# Patient Record
Sex: FEMALE | Race: WHITE | Employment: PART TIME | URBAN - NONMETROPOLITAN AREA
[De-identification: names, ages, dates, MRNs, and addresses within clinical notes are randomized per-mention and may not be internally consistent; named-entity substitution may affect disease eponyms.]

---

## 2017-01-10 DIAGNOSIS — I25.10 CORONARY ARTERY DISEASE INVOLVING NATIVE CORONARY ARTERY OF NATIVE HEART WITHOUT ANGINA PECTORIS: Primary | ICD-10-CM

## 2017-01-11 RX ORDER — CLOPIDOGREL BISULFATE 75 MG/1
75 TABLET ORAL DAILY
Qty: 90 TABLET | Refills: 3 | Status: SHIPPED | OUTPATIENT
Start: 2017-01-11 | End: 2017-06-13 | Stop reason: SDUPTHER

## 2017-06-05 ENCOUNTER — TELEPHONE (OUTPATIENT)
Dept: CARDIOLOGY | Age: 57
End: 2017-06-05

## 2017-06-13 DIAGNOSIS — I10 ESSENTIAL HYPERTENSION: Primary | ICD-10-CM

## 2017-06-13 DIAGNOSIS — I25.10 CORONARY ARTERY DISEASE INVOLVING NATIVE CORONARY ARTERY OF NATIVE HEART WITHOUT ANGINA PECTORIS: ICD-10-CM

## 2017-06-13 RX ORDER — LISINOPRIL 2.5 MG/1
2.5 TABLET ORAL DAILY
Qty: 30 TABLET | Refills: 3 | Status: SHIPPED | OUTPATIENT
Start: 2017-06-13 | End: 2017-11-07 | Stop reason: SDUPTHER

## 2017-06-13 RX ORDER — CLOPIDOGREL BISULFATE 75 MG/1
75 TABLET ORAL DAILY
Qty: 30 TABLET | Refills: 3 | Status: SHIPPED | OUTPATIENT
Start: 2017-06-13 | End: 2017-10-30 | Stop reason: SDUPTHER

## 2017-07-19 ENCOUNTER — OFFICE VISIT (OUTPATIENT)
Dept: CARDIOLOGY | Age: 57
End: 2017-07-19
Payer: COMMERCIAL

## 2017-07-19 VITALS
HEIGHT: 66 IN | HEART RATE: 70 BPM | DIASTOLIC BLOOD PRESSURE: 78 MMHG | BODY MASS INDEX: 34.39 KG/M2 | WEIGHT: 214 LBS | SYSTOLIC BLOOD PRESSURE: 110 MMHG

## 2017-07-19 DIAGNOSIS — Z95.5 PRESENCE OF DRUG COATED STENT IN LAD CORONARY ARTERY: ICD-10-CM

## 2017-07-19 DIAGNOSIS — I10 ESSENTIAL HYPERTENSION: ICD-10-CM

## 2017-07-19 DIAGNOSIS — E78.2 MIXED HYPERLIPIDEMIA: ICD-10-CM

## 2017-07-19 DIAGNOSIS — I25.10 CORONARY ARTERY DISEASE INVOLVING NATIVE CORONARY ARTERY OF NATIVE HEART WITHOUT ANGINA PECTORIS: Primary | ICD-10-CM

## 2017-07-19 PROCEDURE — 99213 OFFICE O/P EST LOW 20 MIN: CPT | Performed by: CLINICAL NURSE SPECIALIST

## 2017-07-19 ASSESSMENT — ENCOUNTER SYMPTOMS
SHORTNESS OF BREATH: 0
HEARTBURN: 0
COUGH: 0
BLURRED VISION: 0
BLOOD IN STOOL: 0
VOMITING: 0
NAUSEA: 0
ORTHOPNEA: 0
CHEST TIGHTNESS: 0

## 2017-10-30 DIAGNOSIS — I25.10 CORONARY ARTERY DISEASE INVOLVING NATIVE CORONARY ARTERY OF NATIVE HEART WITHOUT ANGINA PECTORIS: ICD-10-CM

## 2017-10-31 RX ORDER — CLOPIDOGREL BISULFATE 75 MG/1
75 TABLET ORAL DAILY
Qty: 30 TABLET | Refills: 5 | Status: SHIPPED | OUTPATIENT
Start: 2017-10-31 | End: 2017-12-11 | Stop reason: SDUPTHER

## 2017-11-07 DIAGNOSIS — I10 ESSENTIAL HYPERTENSION: ICD-10-CM

## 2017-11-07 RX ORDER — LISINOPRIL 2.5 MG/1
2.5 TABLET ORAL DAILY
Qty: 30 TABLET | Refills: 5 | Status: SHIPPED | OUTPATIENT
Start: 2017-11-07 | End: 2017-12-11 | Stop reason: SDUPTHER

## 2017-12-11 DIAGNOSIS — I10 ESSENTIAL HYPERTENSION: ICD-10-CM

## 2017-12-11 DIAGNOSIS — I25.10 CORONARY ARTERY DISEASE INVOLVING NATIVE CORONARY ARTERY OF NATIVE HEART WITHOUT ANGINA PECTORIS: ICD-10-CM

## 2017-12-11 RX ORDER — CLOPIDOGREL BISULFATE 75 MG/1
75 TABLET ORAL DAILY
Qty: 90 TABLET | Refills: 3 | Status: SHIPPED | OUTPATIENT
Start: 2017-12-11 | End: 2018-04-10 | Stop reason: SDUPTHER

## 2017-12-11 RX ORDER — LISINOPRIL 2.5 MG/1
2.5 TABLET ORAL DAILY
Qty: 90 TABLET | Refills: 3 | Status: SHIPPED | OUTPATIENT
Start: 2017-12-11 | End: 2018-04-10 | Stop reason: SDUPTHER

## 2018-01-02 ENCOUNTER — TELEPHONE (OUTPATIENT)
Dept: CARDIOLOGY | Age: 58
End: 2018-01-02

## 2018-04-10 DIAGNOSIS — I10 ESSENTIAL HYPERTENSION: ICD-10-CM

## 2018-04-10 DIAGNOSIS — I25.10 CORONARY ARTERY DISEASE INVOLVING NATIVE CORONARY ARTERY OF NATIVE HEART WITHOUT ANGINA PECTORIS: ICD-10-CM

## 2018-04-10 RX ORDER — CLOPIDOGREL BISULFATE 75 MG/1
75 TABLET ORAL DAILY
Qty: 90 TABLET | Refills: 0 | Status: SHIPPED | OUTPATIENT
Start: 2018-04-10 | End: 2018-04-13 | Stop reason: SDUPTHER

## 2018-04-10 RX ORDER — LISINOPRIL 2.5 MG/1
2.5 TABLET ORAL DAILY
Qty: 90 TABLET | Refills: 0 | Status: SHIPPED | OUTPATIENT
Start: 2018-04-10 | End: 2018-04-13 | Stop reason: SDUPTHER

## 2018-04-13 ENCOUNTER — TELEPHONE (OUTPATIENT)
Dept: CARDIOLOGY | Age: 58
End: 2018-04-13

## 2018-04-13 DIAGNOSIS — I10 ESSENTIAL HYPERTENSION: ICD-10-CM

## 2018-04-13 DIAGNOSIS — I25.10 CORONARY ARTERY DISEASE INVOLVING NATIVE CORONARY ARTERY OF NATIVE HEART WITHOUT ANGINA PECTORIS: ICD-10-CM

## 2018-04-13 RX ORDER — LISINOPRIL 2.5 MG/1
2.5 TABLET ORAL DAILY
Qty: 90 TABLET | Refills: 0 | Status: SHIPPED | OUTPATIENT
Start: 2018-04-13 | End: 2018-07-25 | Stop reason: SDUPTHER

## 2018-04-13 RX ORDER — CLOPIDOGREL BISULFATE 75 MG/1
75 TABLET ORAL DAILY
Qty: 90 TABLET | Refills: 0 | Status: SHIPPED | OUTPATIENT
Start: 2018-04-13 | End: 2018-07-25 | Stop reason: SDUPTHER

## 2018-07-25 ENCOUNTER — OFFICE VISIT (OUTPATIENT)
Dept: CARDIOLOGY | Age: 58
End: 2018-07-25
Payer: COMMERCIAL

## 2018-07-25 VITALS
DIASTOLIC BLOOD PRESSURE: 78 MMHG | SYSTOLIC BLOOD PRESSURE: 118 MMHG | WEIGHT: 211 LBS | HEIGHT: 61 IN | BODY MASS INDEX: 39.84 KG/M2 | HEART RATE: 71 BPM

## 2018-07-25 DIAGNOSIS — I25.10 CORONARY ARTERY DISEASE INVOLVING NATIVE CORONARY ARTERY OF NATIVE HEART WITHOUT ANGINA PECTORIS: ICD-10-CM

## 2018-07-25 DIAGNOSIS — E78.2 MIXED HYPERLIPIDEMIA: Primary | ICD-10-CM

## 2018-07-25 DIAGNOSIS — I10 ESSENTIAL HYPERTENSION: ICD-10-CM

## 2018-07-25 PROCEDURE — 1036F TOBACCO NON-USER: CPT | Performed by: NURSE PRACTITIONER

## 2018-07-25 PROCEDURE — G8417 CALC BMI ABV UP PARAM F/U: HCPCS | Performed by: NURSE PRACTITIONER

## 2018-07-25 PROCEDURE — 3017F COLORECTAL CA SCREEN DOC REV: CPT | Performed by: NURSE PRACTITIONER

## 2018-07-25 PROCEDURE — G8427 DOCREV CUR MEDS BY ELIG CLIN: HCPCS | Performed by: NURSE PRACTITIONER

## 2018-07-25 PROCEDURE — G8598 ASA/ANTIPLAT THER USED: HCPCS | Performed by: NURSE PRACTITIONER

## 2018-07-25 PROCEDURE — 99213 OFFICE O/P EST LOW 20 MIN: CPT | Performed by: NURSE PRACTITIONER

## 2018-07-25 PROCEDURE — 93000 ELECTROCARDIOGRAM COMPLETE: CPT | Performed by: NURSE PRACTITIONER

## 2018-07-25 RX ORDER — NITROGLYCERIN 0.4 MG/1
0.4 TABLET SUBLINGUAL EVERY 5 MIN PRN
Qty: 25 TABLET | Refills: 3 | Status: SHIPPED | OUTPATIENT
Start: 2018-07-25

## 2018-07-25 RX ORDER — CLOPIDOGREL BISULFATE 75 MG/1
75 TABLET ORAL DAILY
Qty: 90 TABLET | Refills: 0 | Status: SHIPPED | OUTPATIENT
Start: 2018-07-25 | End: 2018-09-19 | Stop reason: SDUPTHER

## 2018-07-25 RX ORDER — LISINOPRIL 2.5 MG/1
2.5 TABLET ORAL DAILY
Qty: 90 TABLET | Refills: 0 | Status: SHIPPED | OUTPATIENT
Start: 2018-07-25 | End: 2018-09-19 | Stop reason: SDUPTHER

## 2018-07-25 NOTE — PROGRESS NOTES
Dear Dr. Basim Villafuerte MD,    Thank you for allowing me to participate in the care of Ms. Tracey Funez. She presents today at the [address] [place] [address] in the [facility]. As you know, Ms. Dino Concepcion is a 62 y.o. female with history of hypertension, hyperlipidemia,  and CAD s/p stent LAD (2013) who presents with the chief complaint of follow-up for chronic cardiac conditions. She is needing refills on Plavix and Lisinopril. She has done well since last seen. She moved to [place] for a few months and missed her last appointment with Dr. Terry Vee. She denies any chest pain or SOA. She is not too active at home due to chromic back pain. She has been compliant with her medications. Her BP has been controlled. Her PCP follows labs and lipids. She otherwise denies chest pain, SOA, PINTO, PND, orthopnea, syncope or near syncope. She has no other complaints. Review of Systems   Constitutional: Negative for fever, chills, diaphoresis, activity change, appetite change, fatigue and unexpected weight change. Eyes: Negative for photophobia, pain, redness and visual disturbance. Respiratory: Negative for apnea, cough, chest tightness, shortness of breath, wheezing and stridor. Cardiovascular: Negative for chest pain, palpitations and leg swelling. Gastrointestinal: Negative for abdominal distention. Genitourinary: Negative for dysuria, urgency and frequency. Musculoskeletal: Negative for myalgias, arthralgias and gait problem. Skin: Negative for color change, pallor, rash and wound. Neurological: Negative for dizziness, tremors, speech difficulty, weakness and numbness. Hematological: Does not bruise/bleed easily. Psychiatric/Behavioral: Negative.         Past Medical History:   Diagnosis Date    CAD (coronary artery disease) 09/13/2013    EF 20-25% 2013, EF 50-55% 2016    Carpal tunnel syndrome     HTN (hypertension)     Hyperlipidemia     MI (myocardial infarction)     Mixed hyperlipidemia 7/19/2017    Post-menopausal     Presence of drug coated stent in LAD coronary artery     Psoriatic arthritis ([facility] [address]     Sleep apnea     cpap       Past Surgical History:   Procedure Laterality Date    CARDIAC CATHETERIZATION  7/28/13  MDL    angioplasty, stent to LAD. EF 20-25%    CARPAL TUNNEL RELEASE Bilateral 12/27/2016    CARPAL TUNNEL RELEASE performed by Alfa Avendaño MD at [address] [place]      ankle fx. repair 1994    SINUS SURGERY         Family History   Problem Relation Age of Onset    Heart Disease Father        Social History     Social History    Marital status:      Spouse name: N/A    Number of children: N/A    Years of education: N/A     Occupational History    Not on file.      Social History Main Topics    Smoking status: Former Smoker     Types: Cigarettes     Quit date: 7/28/2013    Smokeless tobacco: Never Used    Alcohol use No    Drug use: No    Sexual activity: Not on file     Other Topics Concern    Not on file     Social History Narrative    No narrative on file       Allergies   Allergen Reactions    Chantix [Varenicline] Hives    Morphine          Current Outpatient Prescriptions:     nitroGLYCERIN (NITROSTAT) 0.4 MG SL tablet, Place 1 tablet under the tongue every 5 minutes as needed for Chest pain, Disp: 25 tablet, Rfl: 3    lisinopril (PRINIVIL;ZESTRIL) 2.5 MG tablet, Take 1 tablet by mouth daily, Disp: 90 tablet, Rfl: 0    clopidogrel (PLAVIX) 75 MG tablet, Take 1 tablet by mouth daily, Disp: 90 tablet, Rfl: 0    HYDROcodone-acetaminophen (NORCO) 5-325 MG per tablet, 1-2 tabs po q4-6 hrs prn pain., Disp: 25 tablet, Rfl: 0    ondansetron (ZOFRAN) 4 MG tablet, Take 1 tablet by mouth every 8 hours as needed for Nausea or Vomiting, Disp: 10 tablet, Rfl: 0    levothyroxine (SYNTHROID) 112 MCG tablet, Take 112 mcg by mouth daily Indications: Underactive Thyroid , Disp: , Rfl:     simvastatin (ZOCOR) 40 MG tablet, Take 1 tablet by mouth nightly (Patient taking differently: Take 40 mg by mouth nightly Indications: Blood Cholesterol Abnormal ), Disp: 90 tablet, Rfl: 3    methotrexate (RHEUMATREX) 2.5 MG chemo tablet, Take 2.5 mg by mouth once a week Indications: Psoriasis associated with Arthritis, on saturdays Take 6 tablets weekly, Disp: , Rfl:     folic acid (FOLVITE) 1 MG tablet, Take 1 mg by mouth daily, Disp: , Rfl:     aspirin 81 MG tablet, Take 81 mg by mouth daily. , Disp: , Rfl:     bisoprolol-hydrochlorothiazide (ZIAC) 5-6.25 MG per tablet, Take 1 tablet by mouth daily Indications: High Blood Pressure , Disp: , Rfl:     PE:  Vitals:    07/25/18 0923   BP: 118/78   Pulse: 71       Estimated body mass index is 39.87 kg/m² as calculated from the following:    Height as of this encounter: 5' 1\" (1.549 m). Weight as of this encounter: 211 lb (95.7 kg). Constitutional: She is oriented to person, place, and time. She appears well-developed and well-nourished in no acute distress. Head: Normocephalic and atraumatic. Neck:  Neck supple without JVD present. Cardiovascular: Normal rate, Normal rhythm, normal heart sounds. No murmur ascultated. No gallop and no friction rub. No carotid bruits. No peripheral edema. Pulmonary/Chest:  Lungs clear to auscultation bilaterally without evidence of respiratory distress. She without wheezes. She without rales or ronchi. Musculoskeletal: Normal range of motion. Gait is normal.   Neurological: She is alert and oriented to person, place, and time. Skin: Skin is warm and dry without rash or pallor. Psychiatric: She has a normal mood and affect.  Her behavior is normal. Thought content normal.     Lab Results   Component Value Date    CREATININE 0.7 10/14/2016    CREATININE 0.8 08/03/2015    CREATININE 0.9 04/26/2014    HGB 12.9 10/14/2016    HGB 13.3 08/03/2015    HGB 12.0 04/26/2014       ECG 07/25/18  Sinus Rhythm, HR 72       Assessment,

## 2018-07-25 NOTE — PATIENT INSTRUCTIONS
Angina usually lasts for about 2-10 minutes. It is often relieved with rest. Angina can be triggered by:   Exercise or exertion   Emotional stress   Cold weather   A large meal   Chest pain may indicate more serious unstable angina or a heart attack if: It is unrelieved by rest or nitroglycerin   Severe angina   Angina that begins at rest (with no activity)   Angina that lasts more than 15 minutes   Accompanying symptoms may include:   Shortness of breath   Sweating   Nausea   Weakness   Immediate medical attention is needed for unstable angina. CAD in women may cause less classic chest pain. It is likely to start with shortness of breath and fatigue. Diagnosis   If you go to the emergency room with chest pain, some tests will be done right away. The tests will attempt to see if you are having angina or a heart attack. If you have a stable pattern of angina, other tests may be done to determine the severity of your disease. The doctor will ask about your symptoms and medical history. A physical exam will be done.    Tests may include:   Blood tests to look for certain substances in the blood called troponins which help the doctor determine if you are having a heart attack   Electrocardiogram (ECG, EKG) records the heart's activity by measuring electrical currents through the heart muscle, and can reveal evidence of past heart attacks, acute heart attacks, and heart rhythm problems   Echocardiogram uses high-frequency sound waves (ultrasound) to examine the size, shape, and motion of the heart, giving information about the structure and function of the heart   Exercise stress test records the heart's electrical activity during increased physical activity   Nuclear stress test the heart is observed while exercising and radioactive material highlights impaired blood flow to help locate problem areas   Coronary calcium scoring a type of x-ray called a CAT scan that uses a computer to look for the presence of calcium in the heart arteries   Coronary angiography x-rays taken after a dye is injected into the arteries to allows the doctor to look for abnormalities in the arteries   Treatment   Treatment may include:   Nitroglycerin   This medicine is usually given during an attack of angina. It can be given as a tablet that dissolves under the tongue or as a spray. Longer-lasting types can be used to prevent angina before an activity known to cause it. These may be given as pills or applied as patches or ointments. Blood-Thinning Medications   A small, daily dose of aspirin has been shown to decrease the risk of heart attack. Ask your doctor before taking aspirin daily. Warfarin (Coumadin)   Ticlopidine (Ticlid)   Clopidogrel (Plavix)   Beta-Blockers, Calcium-Channel Blockers, and ACE-Inhibitors   These may help prevent angina. In some cases, they may lower the risk of heart attack. Medications to Lower Cholesterol   Medicines, like statins, are often prescribed to people who have CAD. Statins (eg, atorvastatin [Lipitor]) lower cholesterol levels, which can help to prevent CAD events. Revascularization   Patients with severe blockages in their coronary arteries may benefit from procedures to immediately improve blood flow to the heart muscle:   Percutaneous coronary interventions (PCI)such as balloon angioplasty , in some cases, a wire mesh stent is placed to hold the artery open   Coronary artery bypass grafting (CABG) segments of vessels are taken from other areas of the body and are sewn into the heart arteries to reroute blood flow around blockages   Some studies have shown that CABG may be more effective than PCI. Lifestyle changes and intensive medicine may also be just as effective as PCI.    Options for Refractory Angina   For patients who are not candidates for revascularization procedures but have continued angina despite medicine, options include:   Enhanced external counterpulsation (EECP) large air bags are inflated around the legs in tune with the heart beat. The patient receives 5 one-hour treatments per week for seven weeks. This has been shown to reduce angina and may improve symptom-free exercise duration. Transmyocardial revascularization (TMR) surgical procedure done with laser to reduce chest pain. Researchers are also studying gene therapy as a possible treatment. Prevention   To reduce your risk of getting coronary artery disease:   Maintain a healthy weight. Eat a heart healthy diet that is low in saturated fat , red meat and processed meats, and rich in whole grain , fruits, and vegetables . Begin a safe exercise program with the advice of your doctor. If you smoke, quit . Treat your high blood pressure and/or diabetes. Treat high cholesterol or triglycerides. Ask your doctor about taking a low-dose aspirin every day. In certain patients, taking rosuvastatin (Crestor) may be another option. Talk to your doctor. Hypertension  (High Blood Pressure)  Most people with high blood pressure (hypertension) have no symptoms. High blood pressure can be a dangerous problem. Hypertension is dangerous because you may have it and not know it. High blood pressure may mean that your heart needs to work harder to pump blood. Your blood pressure is measured with 2 numbers. The first number is when your heart flexes (contracts), and the second number is when your heart relaxes. The higher the numbers are, the more you are at risk for problems. Write down your blood pressure today. The best blood pressure for adults is 120/80 (mmHg) or lower. It is likely that your blood pressure was recorded at least 2 times today. It is important for you to give these numbers to your doctor. If you do not have a doctor, try to get follow-up care at a hospital or community clinic. You may need to start high blood pressure medicine. You may also need to adjust your medicines as told by your doctor.  Even mild high only aimed at correcting your cholesterol levels, but also at lowering your overall risk for heart disease and strokes. Diet Changes   Eat a diet low in total fat, saturated fat, and cholesterol . Reduce or eliminate the amount of alcohol you drink. Eat more high-fiber foods. Lifestyle Changes   If you are overweight, lose weight . If you smoke, quit . Exercise regularly . Talk to you doctor before starting an exercise program. Criselda Mcgowan may already have hardening of the arteries or heart disease. These conditions increase your risk of having a heart attack while exercising. Make sure other medical conditions such as high blood pressure and diabetes are being treated and controlled. Medications   There are a number of drugs available, such as statins , to treat this condition and help lower your risk for heart disease. Talk to your doctor. Statins have been shown to reduce mortality (death), heart attacks , and stroke. These medicines are best used as additions to diet and exercise and should not replace healthy lifestyle changes. Prevention   To help reduce your chance of getting hyperlipidemia, take the following steps:   Starting at age 21, get cholesterol tests. Eat a diet low in total fat, saturated fat, and cholesterol. If you smoke, quit. Drink alcohol in moderation (two drinks per day for men, one drink per day for women). If you are overweight, lose weight. Exercise regularly. Talk with your doctor first.   If you have diabetes , control your blood sugar. Talk to your doctor about medications you are taking. They may have side effects that cause hyperlipidemia.      Last Reviewed: September 2010 Lora Orourke DO   Updated: 11/9/2010     Low sodium diet (Caution with any packaged/processed food)  Low fat, low saturated fat diet   [employer]

## 2018-09-19 DIAGNOSIS — I10 ESSENTIAL HYPERTENSION: ICD-10-CM

## 2018-09-19 DIAGNOSIS — I25.10 CORONARY ARTERY DISEASE INVOLVING NATIVE CORONARY ARTERY OF NATIVE HEART WITHOUT ANGINA PECTORIS: ICD-10-CM

## 2018-09-19 RX ORDER — LISINOPRIL 2.5 MG/1
2.5 TABLET ORAL DAILY
Qty: 90 TABLET | Refills: 3 | Status: SHIPPED | OUTPATIENT
Start: 2018-09-19 | End: 2019-07-22 | Stop reason: SDUPTHER

## 2018-09-19 RX ORDER — CLOPIDOGREL BISULFATE 75 MG/1
75 TABLET ORAL DAILY
Qty: 90 TABLET | Refills: 3 | Status: SHIPPED | OUTPATIENT
Start: 2018-09-19 | End: 2019-07-22 | Stop reason: SDUPTHER

## 2018-12-23 ENCOUNTER — APPOINTMENT (OUTPATIENT)
Dept: GENERAL RADIOLOGY | Age: 58
End: 2018-12-23
Payer: COMMERCIAL

## 2018-12-23 ENCOUNTER — HOSPITAL ENCOUNTER (EMERGENCY)
Age: 58
Discharge: HOME | End: 2018-12-23
Payer: COMMERCIAL

## 2018-12-23 VITALS
HEIGHT: 61 IN | SYSTOLIC BLOOD PRESSURE: 119 MMHG | TEMPERATURE: 98.5 F | WEIGHT: 211 LBS | DIASTOLIC BLOOD PRESSURE: 73 MMHG | BODY MASS INDEX: 39.84 KG/M2 | HEART RATE: 78 BPM | OXYGEN SATURATION: 93 % | RESPIRATION RATE: 16 BRPM

## 2018-12-23 DIAGNOSIS — S90.211A SUBUNGUAL HEMATOMA OF GREAT TOE OF RIGHT FOOT, INITIAL ENCOUNTER: Primary | ICD-10-CM

## 2018-12-23 DIAGNOSIS — S91.209A AVULSION OF TOENAIL, INITIAL ENCOUNTER: ICD-10-CM

## 2018-12-23 PROCEDURE — 73660 X-RAY EXAM OF TOE(S): CPT

## 2018-12-23 PROCEDURE — 99283 EMERGENCY DEPT VISIT LOW MDM: CPT

## 2018-12-23 PROCEDURE — 90715 TDAP VACCINE 7 YRS/> IM: CPT | Performed by: NURSE PRACTITIONER

## 2018-12-23 PROCEDURE — 6360000002 HC RX W HCPCS: Performed by: NURSE PRACTITIONER

## 2018-12-23 PROCEDURE — 99283 EMERGENCY DEPT VISIT LOW MDM: CPT | Performed by: NURSE PRACTITIONER

## 2018-12-23 PROCEDURE — 2500000003 HC RX 250 WO HCPCS: Performed by: NURSE PRACTITIONER

## 2018-12-23 PROCEDURE — 90471 IMMUNIZATION ADMIN: CPT | Performed by: NURSE PRACTITIONER

## 2018-12-23 PROCEDURE — 11730 AVULSION NAIL PLATE SIMPLE 1: CPT | Performed by: NURSE PRACTITIONER

## 2018-12-23 RX ORDER — LIDOCAINE HYDROCHLORIDE 10 MG/ML
5 INJECTION, SOLUTION EPIDURAL; INFILTRATION; INTRACAUDAL; PERINEURAL ONCE
Status: COMPLETED | OUTPATIENT
Start: 2018-12-23 | End: 2018-12-23

## 2018-12-23 RX ORDER — BUPIVACAINE HYDROCHLORIDE 5 MG/ML
30 INJECTION, SOLUTION EPIDURAL; INTRACAUDAL ONCE
Status: COMPLETED | OUTPATIENT
Start: 2018-12-23 | End: 2018-12-23

## 2018-12-23 RX ORDER — HYDROCODONE BITARTRATE AND ACETAMINOPHEN 5; 325 MG/1; MG/1
1 TABLET ORAL EVERY 6 HOURS PRN
Qty: 12 TABLET | Refills: 0 | Status: SHIPPED | OUTPATIENT
Start: 2018-12-23 | End: 2018-12-26

## 2018-12-23 RX ORDER — DULOXETIN HYDROCHLORIDE 20 MG/1
20 CAPSULE, DELAYED RELEASE ORAL DAILY
COMMUNITY
End: 2019-07-18 | Stop reason: ALTCHOICE

## 2018-12-23 RX ADMIN — BUPIVACAINE HYDROCHLORIDE 150 MG: 5 INJECTION, SOLUTION EPIDURAL; INTRACAUDAL; PERINEURAL at 09:00

## 2018-12-23 RX ADMIN — LIDOCAINE HYDROCHLORIDE 5 ML: 10 INJECTION, SOLUTION EPIDURAL; INFILTRATION; INTRACAUDAL at 09:00

## 2018-12-23 RX ADMIN — TETANUS TOXOID, REDUCED DIPHTHERIA TOXOID AND ACELLULAR PERTUSSIS VACCINE, ADSORBED 0.5 ML: 5; 2.5; 8; 8; 2.5 SUSPENSION INTRAMUSCULAR at 09:39

## 2018-12-23 ASSESSMENT — PAIN SCALES - GENERAL: PAINLEVEL_OUTOF10: 4

## 2018-12-23 ASSESSMENT — PAIN DESCRIPTION - LOCATION: LOCATION: TOE (COMMENT WHICH ONE)

## 2018-12-23 ASSESSMENT — PAIN DESCRIPTION - ORIENTATION: ORIENTATION: RIGHT

## 2018-12-23 ASSESSMENT — PAIN DESCRIPTION - PAIN TYPE: TYPE: ACUTE PAIN

## 2019-01-25 ENCOUNTER — OFFICE VISIT (OUTPATIENT)
Dept: CARDIOLOGY | Age: 59
End: 2019-01-25
Payer: COMMERCIAL

## 2019-01-25 VITALS
DIASTOLIC BLOOD PRESSURE: 88 MMHG | SYSTOLIC BLOOD PRESSURE: 128 MMHG | HEART RATE: 70 BPM | WEIGHT: 203 LBS | HEIGHT: 66 IN | BODY MASS INDEX: 32.62 KG/M2

## 2019-01-25 DIAGNOSIS — E66.09 CLASS 1 OBESITY DUE TO EXCESS CALORIES WITH SERIOUS COMORBIDITY AND BODY MASS INDEX (BMI) OF 32.0 TO 32.9 IN ADULT: ICD-10-CM

## 2019-01-25 DIAGNOSIS — I25.10 CORONARY ARTERY DISEASE INVOLVING NATIVE CORONARY ARTERY OF NATIVE HEART WITHOUT ANGINA PECTORIS: Primary | ICD-10-CM

## 2019-01-25 PROBLEM — E66.811 CLASS 1 OBESITY DUE TO EXCESS CALORIES IN ADULT: Status: ACTIVE | Noted: 2019-01-25

## 2019-01-25 PROCEDURE — G8598 ASA/ANTIPLAT THER USED: HCPCS | Performed by: INTERNAL MEDICINE

## 2019-01-25 PROCEDURE — 93000 ELECTROCARDIOGRAM COMPLETE: CPT | Performed by: INTERNAL MEDICINE

## 2019-01-25 PROCEDURE — G8427 DOCREV CUR MEDS BY ELIG CLIN: HCPCS | Performed by: INTERNAL MEDICINE

## 2019-01-25 PROCEDURE — 1036F TOBACCO NON-USER: CPT | Performed by: INTERNAL MEDICINE

## 2019-01-25 PROCEDURE — 99213 OFFICE O/P EST LOW 20 MIN: CPT | Performed by: INTERNAL MEDICINE

## 2019-01-25 PROCEDURE — G8484 FLU IMMUNIZE NO ADMIN: HCPCS | Performed by: INTERNAL MEDICINE

## 2019-01-25 PROCEDURE — 3017F COLORECTAL CA SCREEN DOC REV: CPT | Performed by: INTERNAL MEDICINE

## 2019-01-25 PROCEDURE — G8417 CALC BMI ABV UP PARAM F/U: HCPCS | Performed by: INTERNAL MEDICINE

## 2019-01-25 RX ORDER — LEVOTHYROXINE SODIUM 0.12 MG/1
125 TABLET ORAL DAILY
COMMUNITY

## 2019-04-28 ENCOUNTER — HOSPITAL ENCOUNTER (OUTPATIENT)
Age: 59
Setting detail: OBSERVATION
LOS: 1 days | Discharge: HOME | End: 2019-04-29
Attending: EMERGENCY MEDICINE | Admitting: FAMILY MEDICINE
Payer: COMMERCIAL

## 2019-04-28 ENCOUNTER — APPOINTMENT (OUTPATIENT)
Dept: GENERAL RADIOLOGY | Age: 59
End: 2019-04-28
Payer: COMMERCIAL

## 2019-04-28 DIAGNOSIS — R07.89 CHEST DISCOMFORT: Primary | ICD-10-CM

## 2019-04-28 PROBLEM — R07.9 CHEST PAIN: Status: ACTIVE | Noted: 2019-04-28

## 2019-04-28 LAB
ALBUMIN SERPL-MCNC: 4.1 G/DL (ref 3.5–5.2)
ALP BLD-CCNC: 74 U/L (ref 35–104)
ALT SERPL-CCNC: 16 U/L (ref 5–33)
ANION GAP SERPL CALCULATED.3IONS-SCNC: 12 MMOL/L (ref 7–19)
AST SERPL-CCNC: 11 U/L (ref 5–32)
BASOPHILS ABSOLUTE: 0.1 K/UL (ref 0–0.2)
BASOPHILS RELATIVE PERCENT: 0.9 % (ref 0–1)
BILIRUB SERPL-MCNC: 0.3 MG/DL (ref 0.2–1.2)
BUN BLDV-MCNC: 25 MG/DL (ref 6–20)
CALCIUM SERPL-MCNC: 9.8 MG/DL (ref 8.6–10)
CHLORIDE BLD-SCNC: 101 MMOL/L (ref 98–111)
CO2: 28 MMOL/L (ref 22–29)
CREAT SERPL-MCNC: 0.8 MG/DL (ref 0.5–0.9)
EOSINOPHILS ABSOLUTE: 0.2 K/UL (ref 0–0.6)
EOSINOPHILS RELATIVE PERCENT: 3 % (ref 0–5)
GFR NON-AFRICAN AMERICAN: >60
GLUCOSE BLD-MCNC: 178 MG/DL (ref 74–109)
HCT VFR BLD CALC: 41.6 % (ref 37–47)
HEMOGLOBIN: 13.1 G/DL (ref 12–16)
LYMPHOCYTES ABSOLUTE: 2.5 K/UL (ref 1.1–4.5)
LYMPHOCYTES RELATIVE PERCENT: 31.3 % (ref 20–40)
MCH RBC QN AUTO: 32.9 PG (ref 27–31)
MCHC RBC AUTO-ENTMCNC: 31.5 G/DL (ref 33–37)
MCV RBC AUTO: 104.5 FL (ref 81–99)
MONOCYTES ABSOLUTE: 0.6 K/UL (ref 0–0.9)
MONOCYTES RELATIVE PERCENT: 7.8 % (ref 0–10)
NEUTROPHILS ABSOLUTE: 4.5 K/UL (ref 1.5–7.5)
NEUTROPHILS RELATIVE PERCENT: 56.7 % (ref 50–65)
PDW BLD-RTO: 13.9 % (ref 11.5–14.5)
PLATELET # BLD: 295 K/UL (ref 130–400)
PMV BLD AUTO: 11.3 FL (ref 9.4–12.3)
POTASSIUM REFLEX MAGNESIUM: 3.9 MMOL/L (ref 3.5–5)
RBC # BLD: 3.98 M/UL (ref 4.2–5.4)
SODIUM BLD-SCNC: 141 MMOL/L (ref 136–145)
TOTAL PROTEIN: 7.4 G/DL (ref 6.6–8.7)
TROPONIN: <0.01 NG/ML (ref 0–0.03)
WBC # BLD: 7.9 K/UL (ref 4.8–10.8)

## 2019-04-28 PROCEDURE — 71045 X-RAY EXAM CHEST 1 VIEW: CPT

## 2019-04-28 PROCEDURE — 36415 COLL VENOUS BLD VENIPUNCTURE: CPT

## 2019-04-28 PROCEDURE — 99223 1ST HOSP IP/OBS HIGH 75: CPT | Performed by: INTERNAL MEDICINE

## 2019-04-28 PROCEDURE — 84484 ASSAY OF TROPONIN QUANT: CPT

## 2019-04-28 PROCEDURE — 93005 ELECTROCARDIOGRAM TRACING: CPT

## 2019-04-28 PROCEDURE — 6370000000 HC RX 637 (ALT 250 FOR IP): Performed by: FAMILY MEDICINE

## 2019-04-28 PROCEDURE — 6360000002 HC RX W HCPCS: Performed by: EMERGENCY MEDICINE

## 2019-04-28 PROCEDURE — 6360000002 HC RX W HCPCS: Performed by: FAMILY MEDICINE

## 2019-04-28 PROCEDURE — G0378 HOSPITAL OBSERVATION PER HR: HCPCS

## 2019-04-28 PROCEDURE — 96375 TX/PRO/DX INJ NEW DRUG ADDON: CPT

## 2019-04-28 PROCEDURE — 80053 COMPREHEN METABOLIC PANEL: CPT

## 2019-04-28 PROCEDURE — 2580000003 HC RX 258: Performed by: INTERNAL MEDICINE

## 2019-04-28 PROCEDURE — 96374 THER/PROPH/DIAG INJ IV PUSH: CPT

## 2019-04-28 PROCEDURE — 99285 EMERGENCY DEPT VISIT HI MDM: CPT

## 2019-04-28 PROCEDURE — 85025 COMPLETE CBC W/AUTO DIFF WBC: CPT

## 2019-04-28 PROCEDURE — 99285 EMERGENCY DEPT VISIT HI MDM: CPT | Performed by: EMERGENCY MEDICINE

## 2019-04-28 PROCEDURE — 96376 TX/PRO/DX INJ SAME DRUG ADON: CPT

## 2019-04-28 RX ORDER — NITROGLYCERIN 0.4 MG/1
0.4 TABLET SUBLINGUAL EVERY 5 MIN PRN
Status: DISCONTINUED | OUTPATIENT
Start: 2019-04-28 | End: 2019-04-29 | Stop reason: HOSPADM

## 2019-04-28 RX ORDER — LISINOPRIL 5 MG/1
2.5 TABLET ORAL DAILY
Status: DISCONTINUED | OUTPATIENT
Start: 2019-04-28 | End: 2019-04-29 | Stop reason: HOSPADM

## 2019-04-28 RX ORDER — SODIUM CHLORIDE 9 MG/ML
INJECTION, SOLUTION INTRAVENOUS CONTINUOUS
Status: DISCONTINUED | OUTPATIENT
Start: 2019-04-28 | End: 2019-04-29 | Stop reason: SDUPTHER

## 2019-04-28 RX ORDER — SIMVASTATIN 40 MG
40 TABLET ORAL NIGHTLY
Status: DISCONTINUED | OUTPATIENT
Start: 2019-04-28 | End: 2019-04-29 | Stop reason: HOSPADM

## 2019-04-28 RX ORDER — ONDANSETRON 2 MG/ML
4 INJECTION INTRAMUSCULAR; INTRAVENOUS ONCE
Status: COMPLETED | OUTPATIENT
Start: 2019-04-28 | End: 2019-04-28

## 2019-04-28 RX ORDER — ONDANSETRON 2 MG/ML
4 INJECTION INTRAMUSCULAR; INTRAVENOUS EVERY 6 HOURS PRN
Status: DISCONTINUED | OUTPATIENT
Start: 2019-04-28 | End: 2019-04-29 | Stop reason: SDUPTHER

## 2019-04-28 RX ORDER — BISOPROLOL FUMARATE AND HYDROCHLOROTHIAZIDE 5; 6.25 MG/1; MG/1
1 TABLET ORAL DAILY
Status: DISCONTINUED | OUTPATIENT
Start: 2019-04-28 | End: 2019-04-29 | Stop reason: HOSPADM

## 2019-04-28 RX ORDER — ASPIRIN 81 MG/1
81 TABLET ORAL ONCE
Status: DISCONTINUED | OUTPATIENT
Start: 2019-04-28 | End: 2019-04-29 | Stop reason: HOSPADM

## 2019-04-28 RX ORDER — DULOXETIN HYDROCHLORIDE 20 MG/1
20 CAPSULE, DELAYED RELEASE ORAL DAILY
Status: DISCONTINUED | OUTPATIENT
Start: 2019-04-28 | End: 2019-04-29 | Stop reason: HOSPADM

## 2019-04-28 RX ORDER — ONDANSETRON 2 MG/ML
4 INJECTION INTRAMUSCULAR; INTRAVENOUS EVERY 8 HOURS PRN
Status: DISCONTINUED | OUTPATIENT
Start: 2019-04-28 | End: 2019-04-29 | Stop reason: SDUPTHER

## 2019-04-28 RX ORDER — ASPIRIN 81 MG/1
81 TABLET, CHEWABLE ORAL DAILY
Status: DISCONTINUED | OUTPATIENT
Start: 2019-04-28 | End: 2019-04-29 | Stop reason: HOSPADM

## 2019-04-28 RX ORDER — CLOPIDOGREL BISULFATE 75 MG/1
75 TABLET ORAL DAILY
Status: DISCONTINUED | OUTPATIENT
Start: 2019-04-28 | End: 2019-04-29 | Stop reason: HOSPADM

## 2019-04-28 RX ORDER — SODIUM CHLORIDE 0.9 % (FLUSH) 0.9 %
10 SYRINGE (ML) INJECTION PRN
Status: DISCONTINUED | OUTPATIENT
Start: 2019-04-28 | End: 2019-04-29 | Stop reason: SDUPTHER

## 2019-04-28 RX ORDER — SODIUM CHLORIDE 0.9 % (FLUSH) 0.9 %
10 SYRINGE (ML) INJECTION EVERY 12 HOURS SCHEDULED
Status: DISCONTINUED | OUTPATIENT
Start: 2019-04-28 | End: 2019-04-29 | Stop reason: SDUPTHER

## 2019-04-28 RX ORDER — ALPRAZOLAM 0.5 MG/1
0.5 TABLET ORAL
Status: ACTIVE | OUTPATIENT
Start: 2019-04-28 | End: 2019-04-28

## 2019-04-28 RX ORDER — ONDANSETRON 4 MG/1
4 TABLET, FILM COATED ORAL EVERY 8 HOURS PRN
Status: DISCONTINUED | OUTPATIENT
Start: 2019-04-28 | End: 2019-04-29 | Stop reason: HOSPADM

## 2019-04-28 RX ORDER — ACETAMINOPHEN 325 MG/1
650 TABLET ORAL EVERY 4 HOURS PRN
Status: DISCONTINUED | OUTPATIENT
Start: 2019-04-28 | End: 2019-04-29 | Stop reason: SDUPTHER

## 2019-04-28 RX ORDER — FOLIC ACID 1 MG/1
1 TABLET ORAL DAILY
Status: DISCONTINUED | OUTPATIENT
Start: 2019-04-28 | End: 2019-04-29 | Stop reason: HOSPADM

## 2019-04-28 RX ADMIN — ONDANSETRON 4 MG: 2 INJECTION INTRAMUSCULAR; INTRAVENOUS at 15:38

## 2019-04-28 RX ADMIN — CLOPIDOGREL BISULFATE 75 MG: 75 TABLET ORAL at 20:15

## 2019-04-28 RX ADMIN — HYDROMORPHONE HYDROCHLORIDE 0.5 MG: 1 INJECTION, SOLUTION INTRAMUSCULAR; INTRAVENOUS; SUBCUTANEOUS at 15:06

## 2019-04-28 RX ADMIN — DULOXETINE HYDROCHLORIDE 20 MG: 20 CAPSULE, DELAYED RELEASE ORAL at 21:05

## 2019-04-28 RX ADMIN — FOLIC ACID 1 MG: 1 TABLET ORAL at 20:15

## 2019-04-28 RX ADMIN — SIMVASTATIN 40 MG: 40 TABLET, FILM COATED ORAL at 20:15

## 2019-04-28 RX ADMIN — METHOTREXATE 15 MG: 2.5 TABLET ORAL at 21:06

## 2019-04-28 RX ADMIN — Medication 10 ML: at 20:14

## 2019-04-28 RX ADMIN — SODIUM CHLORIDE: 9 INJECTION, SOLUTION INTRAVENOUS at 20:14

## 2019-04-28 RX ADMIN — HYDROMORPHONE HYDROCHLORIDE 0.5 MG: 1 INJECTION, SOLUTION INTRAMUSCULAR; INTRAVENOUS; SUBCUTANEOUS at 15:38

## 2019-04-28 ASSESSMENT — PAIN SCALES - GENERAL
PAINLEVEL_OUTOF10: 6
PAINLEVEL_OUTOF10: 0
PAINLEVEL_OUTOF10: 0
PAINLEVEL_OUTOF10: 6
PAINLEVEL_OUTOF10: 3

## 2019-04-28 ASSESSMENT — ENCOUNTER SYMPTOMS
EYES NEGATIVE: 1
VOMITING: 0
NAUSEA: 1
GASTROINTESTINAL NEGATIVE: 1
SHORTNESS OF BREATH: 1
DIARRHEA: 0
RESPIRATORY NEGATIVE: 1
NAUSEA: 0
SHORTNESS OF BREATH: 0
BACK PAIN: 0

## 2019-04-28 NOTE — ED PROVIDER NOTES
[facility] CARE  eMERGENCY dEPARTMENT eNCOUnter      Pt Name: Saritha Callahan  MRN: [id number]  [place] 1960  Date of evaluation: 4/28/2019  Provider: Sheldon Garcia MD    [address] [place] [address]       Chief Complaint   Patient presents with    Chest Pain         HISTORY OF PRESENT ILLNESS   (Location/Symptom, Timing/Onset,Context/Setting, Quality, Duration, Modifying Factors, Severity)  Note limiting factors. Saritha Callahan is a 62 y.o. female who presents to the emergency department      The history is provided by the patient. Chest Pain   Pain location:  L chest  Pain quality: sharp    Pain radiates to:  L shoulder  Pain severity:  Moderate  Onset quality:  Sudden  Duration: 1215. Timing:  Constant  Progression:  Unchanged  Chronicity:  New  Relieved by:  Nothing  Worsened by:  Nothing  Ineffective treatments:  Nitroglycerin (x 2, was given 325 mg ASA en route)  Associated symptoms: diaphoresis, nausea and shortness of breath    Associated symptoms: no back pain, no palpitations and no vomiting    Risk factors: coronary artery disease (past MI, stent 2013)        NursingNotes were reviewed. REVIEW OF SYSTEMS    (2-9 systems for level 4, 10 or more for level 5)     Review of Systems   Constitutional: Positive for diaphoresis. Respiratory: Positive for shortness of breath. Cardiovascular: Positive for chest pain. Negative for palpitations. Gastrointestinal: Positive for nausea. Negative for vomiting. Musculoskeletal: Negative for back pain. All other systems reviewed and are negative. Except as noted above the remainder of the review of systems was reviewed and negative.        PAST MEDICAL HISTORY     Past Medical History:   Diagnosis Date    CAD (coronary artery disease) 09/13/2013    EF 20-25% 2013, EF 50-55% 2016    Carpal tunnel syndrome     Fibromyalgia     HTN (hypertension)     Hyperlipidemia     Mixed hyperlipidemia 7/19/2017    Post-menopausal     Psoriatic arthritis ([facility] [address]     Sleep apnea     cpap         SURGICALHISTORY       Past Surgical History:   Procedure Laterality Date    CARDIAC CATHETERIZATION  7/28/13  MDL    angioplasty, stent to LAD. EF 20-25%    CARPAL TUNNEL RELEASE Bilateral 12/27/2016    CARPAL TUNNEL RELEASE performed by Jane Galindo MD at [address] [place]      ankle fx. repair 1994   [address]       Current Discharge Medication List      CONTINUE these medications which have NOT CHANGED    Details   levothyroxine (SYNTHROID) 125 MCG tablet Take 125 mcg by mouth Daily      DULoxetine (CYMBALTA) 20 MG extended release capsule Take 20 mg by mouth daily      clopidogrel (PLAVIX) 75 MG tablet Take 1 tablet by mouth daily  Qty: 90 tablet, Refills: 3    Associated Diagnoses: Coronary artery disease involving native coronary artery of native heart without angina pectoris      lisinopril (PRINIVIL;ZESTRIL) 2.5 MG tablet Take 1 tablet by mouth daily  Qty: 90 tablet, Refills: 3    Associated Diagnoses: Essential hypertension      nitroGLYCERIN (NITROSTAT) 0.4 MG SL tablet Place 1 tablet under the tongue every 5 minutes as needed for Chest pain  Qty: 25 tablet, Refills: 3    Associated Diagnoses: Mixed hyperlipidemia      ondansetron (ZOFRAN) 4 MG tablet Take 1 tablet by mouth every 8 hours as needed for Nausea or Vomiting  Qty: 10 tablet, Refills: 0      simvastatin (ZOCOR) 40 MG tablet Take 1 tablet by mouth nightly  Qty: 90 tablet, Refills: 3      methotrexate (RHEUMATREX) 2.5 MG chemo tablet Take 2.5 mg by mouth once a week Indications: Psoriasis associated with Arthritis, on saturdays Take 6 tablets weekly      folic acid (FOLVITE) 1 MG tablet Take 1 mg by mouth daily      aspirin 81 MG tablet Take 81 mg by mouth daily.       bisoprolol-hydrochlorothiazide (ZIAC) 5-6.25 MG per tablet Take 1 tablet by mouth daily Indications: High Blood Pressure              ALLERGIES     Chantix [varenicline] and Morphine    FAMILY HISTORY       Family History   Problem Relation Age of Onset    Heart Disease Father           SOCIAL HISTORY       Social History     Socioeconomic History    Marital status:      Spouse name: None    Number of children: None    Years of education: None    Highest education level: None   Occupational History    None   Social Needs    Financial resource strain: None    Food insecurity:     Worry: None     Inability: None    Transportation needs:     Medical: None     Non-medical: None   Tobacco Use    Smoking status: Former Smoker     Types: Cigarettes     Last attempt to quit: 2013     Years since quittin.7    Smokeless tobacco: Never Used   Substance and Sexual Activity    Alcohol use: No    Drug use: No    Sexual activity: None   Lifestyle    Physical activity:     Days per week: None     Minutes per session: None    Stress: None   Relationships    Social connections:     Talks on phone: None     Gets together: None     Attends Voodoo service: None     Active member of club or organization: None     Attends meetings of clubs or organizations: None     Relationship status: None    Intimate partner violence:     Fear of current or ex partner: None     Emotionally abused: None     Physically abused: None     Forced sexual activity: None   Other Topics Concern    None   Social History Narrative     34 years second marriage    She has no children    Education high school    [employer] not presently attending a [employer]    She enjoys cooking and gardening    Remains moderately active but physically sedentary    Smoked one half pack per day quit 6 years ago    Denies alcohol consumption or substance usage       SCREENINGS      @FLOW([id number])@      PHYSICAL EXAM    (up to 7 for level 4, 8 or more for level 5)     ED Triage Vitals [19 1426]   BP Temp Temp Source Pulse Resp SpO2 Height Weight   130/84 98.2 °F (36.8 °C) Oral 84 22 93 % -- 200 lb TROPONIN   CBC   COMPREHENSIVE METABOLIC PANEL W/ REFLEX TO MG FOR LOW K   LIPID PANEL   PREGNANCY, URINE       All other labs were within normal range or not returned as of this dictation. EMERGENCY DEPARTMENT COURSE and DIFFERENTIAL DIAGNOSIS/MDM:   Vitals:    Vitals:    04/28/19 1426 04/28/19 1600 04/28/19 1800 04/28/19 1907   BP: 130/84 121/79 125/74 131/79   Pulse: 84 79 77 81   Resp: 22 22 20 20   Temp: 98.2 °F (36.8 °C)  98.4 °F (36.9 °C) 98.1 °F (36.7 °C)   TempSrc: Oral   Temporal   SpO2: 93% 94% 94% 93%   Weight: 200 lb (90.7 kg)              MDM  Number of Diagnoses or Management Options  Diagnosis management comments: Discussed with Dr. Malou Galindo - silvia, Cardiology      CRITICAL CARE TIME   Total Critical Care time was 0 minutes, excluding separately reportable procedures. There was a high probability of clinically significant/lifethreatening deterioration in the patient's condition which required my urgent intervention. CONSULTS:  IP CONSULT TO CARDIOLOGY  IP CONSULT TO CARDIOLOGY    PROCEDURES:  Unless otherwise noted below, none     Procedures    FINAL IMPRESSION      1.  Chest discomfort          DISPOSITION/PLAN   DISPOSITION Admitted 04/28/2019 04:15:48 PM      PATIENT REFERRED TO:  Houston Pierson MD  [address] [place] [address] [id number]  [place] [phone]            DISCHARGE MEDICATIONS:  Current Discharge Medication List             (Please note that portions of this note were completed with a voice recognition program.  Efforts were made to edit the dictations but occasionally words are mis-transcribed.)    Michele Adorno MD (electronically signed)  Attending Emergency Physician          Michele Adorno MD  04/28/19 [id number]

## 2019-04-28 NOTE — CONSULTS
Past Surgical History:  Past Surgical History:   Procedure Laterality Date    CARDIAC CATHETERIZATION  13  MDL    angioplasty, stent to LAD. EF 20-25%    CARPAL TUNNEL RELEASE Bilateral 2016    CARPAL TUNNEL RELEASE performed by Chavo Kent MD at [address] [facility]      ankle fx. repair 1994    SINUS SURGERY         Past Family History:  Family History   Problem Relation Age of Onset    Heart Disease Father        Past Social History:  Social History     Socioeconomic History    Marital status:      Spouse name: Not on file    Number of children: Not on file    Years of education: Not on file    Highest education level: Not on file   Occupational History    Not on file   Social Needs    Financial resource strain: Not on file    Food insecurity:     Worry: Not on file     Inability: Not on file    Transportation needs:     Medical: Not on file     Non-medical: Not on file   Tobacco Use    Smoking status: Former Smoker     Types: Cigarettes     Last attempt to quit: 2013     Years since quittin.7    Smokeless tobacco: Never Used   Substance and Sexual Activity    Alcohol use: No    Drug use: No    Sexual activity: Not on file   Lifestyle    Physical activity:     Days per week: Not on file     Minutes per session: Not on file    Stress: Not on file   Relationships    Social connections:     Talks on phone: Not on file     Gets together: Not on file     Attends Shinto service: Not on file     Active member of club or organization: Not on file     Attends meetings of clubs or organizations: Not on file     Relationship status: Not on file    Intimate partner violence:     Fear of current or ex partner: Not on file     Emotionally abused: Not on file     Physically abused: Not on file     Forced sexual activity: Not on file   Other Topics Concern    Not on file   Social History Narrative     34 years second marriage    She has no children Education high school    Worship by katerina not presently attending a [employer]    She enjoys cooking and gardening    Remains moderately active but physically sedentary    Smoked one half pack per day quit 6 years ago    Denies alcohol consumption or substance usage       Allergies: Allergies   Allergen Reactions    Chantix [Varenicline] Hives    Morphine        Home Meds:  Prior to Admission medications    Medication Sig Start Date End Date Taking? Authorizing Provider   levothyroxine (SYNTHROID) 125 MCG tablet Take 125 mcg by mouth Daily   Yes Historical Provider, MD   DULoxetine (CYMBALTA) 20 MG extended release capsule Take 20 mg by mouth daily   Yes Historical Provider, MD   clopidogrel (PLAVIX) 75 MG tablet Take 1 tablet by mouth daily 9/19/18  Yes SO Tillman   lisinopril (PRINIVIL;ZESTRIL) 2.5 MG tablet Take 1 tablet by mouth daily 9/19/18  Yes SO Tillman   nitroGLYCERIN (NITROSTAT) 0.4 MG SL tablet Place 1 tablet under the tongue every 5 minutes as needed for Chest pain 7/25/18  Yes SO Chatman   ondansetron (ZOFRAN) 4 MG tablet Take 1 tablet by mouth every 8 hours as needed for Nausea or Vomiting 12/27/16  Yes Nancy Luna MD   simvastatin (ZOCOR) 40 MG tablet Take 1 tablet by mouth nightly  Patient taking differently: Take 40 mg by mouth nightly Indications: Blood Cholesterol Abnormal  6/8/16  Yes Yolanda Chou MD   methotrexate (RHEUMATREX) 2.5 MG chemo tablet Take 2.5 mg by mouth once a week Indications: Psoriasis associated with Arthritis, on saturdays Take 6 tablets weekly 12/31/15  Yes Historical Provider, MD   folic acid (FOLVITE) 1 MG tablet Take 1 mg by mouth daily 12/31/15  Yes Historical Provider, MD   aspirin 81 MG tablet Take 81 mg by mouth daily.    Yes Historical Provider, MD   bisoprolol-hydrochlorothiazide (ZIAC) 5-6.25 MG per tablet Take 1 tablet by mouth daily Indications: High Blood Pressure    Yes Historical Provider, MD       Current CK, CKMB, Troponin: @LABRCNT (CKTOTAL:3, CKMB:3, TROPONINI:3)@    Last 3 BNP:  No results for input(s): BNP in the last 72 hours. IMAGING:  Xr Chest Portable    Result Date: 4/28/2019  XR CHEST PORTABLE 4/28/2019 1:45 PM HISTORY:   Chest pain  Single view. COMPARISONS:  10/14/2016 chest radiography FINDINGS: The lungs are clear without infiltrates. The heart is normal in size, pulmonary circulation appropriate, without heart failure. The bony structures are intact without acute osseous abnormality. . Radiographically, the chest is unchanged. No acute cardiopulmonary process. Signed by Dr Lena Houser on 4/28/2019 2:59 PM      Assessment:  1. Chest heaviness onset at 12:15 today suspicious for unstable angina  2. Several month history of exertional chest discomfort and dyspnea  3. History of myocardial infarction 6 years ago with cardiac catheterization and stent placement  4. Prior tobacco abuse discontinued 6 years ago  5. Hypothyroidism  6. Gastroesophageal reflux disease  7. Hypertension  8. Psoriasis  9. Hyperlipidemia  10. Carpal tunnel syndrome  11. Obesity      Recommendations:  1. Recommend diagnostic catheterization patient agreeable plan tomorrow  I have discussed with the patient regarding indications for the proposed procedure LEFT HEART CATHETERIZATION AND POSSIBLE PERCUTANEOUS INTERVENTION  along with possible alternatives benefits and risks including but not limited to risks of death, myocardial infarction, stroke, contrast induced nephropathy which in some cases may lead to acute kidney failure requiring dialysis, allergic reactions, bleeding requiring blood transfusion,  cardiac arrhthymias, respiratory failure which may require placing the patient on respiratory support such as a ventilator or breathing machine,risk of complications which may require vascular surgery, and if coronary intervention is performed emergency CABG may be required in less than 1% of cases.  The patient is

## 2019-04-28 NOTE — ED NOTES
Bed: 09  Expected date:   Expected time:   Means of arrival:   Comments:  Via Evelio Bradley RN  04/28/19 8424

## 2019-04-28 NOTE — LETTER
[facility] [address]  Phone: [phone]             April 29, 2019    Patient: Carmen Gerber   YOB: 1960   Date of Visit: 4/28/2019       To Whom It May Concern:    Carmen Gerber was seen and treated in our facility  beginning 4/28/2019 until 4/29/19. She may return to work on thursday may 2nd.       Sincerely,       Igor Mak RN         Signature:__________________________________

## 2019-04-29 VITALS
HEART RATE: 96 BPM | OXYGEN SATURATION: 92 % | RESPIRATION RATE: 18 BRPM | BODY MASS INDEX: 34.46 KG/M2 | WEIGHT: 214.4 LBS | DIASTOLIC BLOOD PRESSURE: 80 MMHG | SYSTOLIC BLOOD PRESSURE: 131 MMHG | TEMPERATURE: 97.8 F | HEIGHT: 66 IN

## 2019-04-29 LAB
ALBUMIN SERPL-MCNC: 3.7 G/DL (ref 3.5–5.2)
ALP BLD-CCNC: 64 U/L (ref 35–104)
ALT SERPL-CCNC: 13 U/L (ref 5–33)
ANION GAP SERPL CALCULATED.3IONS-SCNC: 9 MMOL/L (ref 7–19)
AST SERPL-CCNC: 10 U/L (ref 5–32)
BILIRUB SERPL-MCNC: <0.2 MG/DL (ref 0.2–1.2)
BUN BLDV-MCNC: 23 MG/DL (ref 6–20)
CALCIUM SERPL-MCNC: 9.1 MG/DL (ref 8.6–10)
CHLORIDE BLD-SCNC: 104 MMOL/L (ref 98–111)
CHOLESTEROL, TOTAL: 145 MG/DL (ref 160–199)
CO2: 28 MMOL/L (ref 22–29)
CREAT SERPL-MCNC: 0.7 MG/DL (ref 0.5–0.9)
EKG P AXIS: 51 DEGREES
EKG P-R INTERVAL: 168 MS
EKG Q-T INTERVAL: 380 MS
EKG QRS DURATION: 78 MS
EKG QTC CALCULATION (BAZETT): 418 MS
EKG T AXIS: 76 DEGREES
GFR NON-AFRICAN AMERICAN: >60
GLUCOSE BLD-MCNC: 120 MG/DL (ref 74–109)
HCT VFR BLD CALC: 37.1 % (ref 37–47)
HDLC SERPL-MCNC: 42 MG/DL (ref 65–121)
HEMOGLOBIN: 11.6 G/DL (ref 12–16)
LDL CHOLESTEROL CALCULATED: 59 MG/DL
MCH RBC QN AUTO: 32.2 PG (ref 27–31)
MCHC RBC AUTO-ENTMCNC: 31.3 G/DL (ref 33–37)
MCV RBC AUTO: 103.1 FL (ref 81–99)
PDW BLD-RTO: 13.9 % (ref 11.5–14.5)
PLATELET # BLD: 242 K/UL (ref 130–400)
PMV BLD AUTO: 11.1 FL (ref 9.4–12.3)
POTASSIUM REFLEX MAGNESIUM: 3.8 MMOL/L (ref 3.5–5)
RBC # BLD: 3.6 M/UL (ref 4.2–5.4)
SODIUM BLD-SCNC: 141 MMOL/L (ref 136–145)
TOTAL PROTEIN: 6.3 G/DL (ref 6.6–8.7)
TRIGL SERPL-MCNC: 218 MG/DL (ref 0–149)
TROPONIN: <0.01 NG/ML (ref 0–0.03)
WBC # BLD: 8.5 K/UL (ref 4.8–10.8)

## 2019-04-29 PROCEDURE — 36415 COLL VENOUS BLD VENIPUNCTURE: CPT

## 2019-04-29 PROCEDURE — 6370000000 HC RX 637 (ALT 250 FOR IP): Performed by: FAMILY MEDICINE

## 2019-04-29 PROCEDURE — 2709999900 HC NON-CHARGEABLE SUPPLY

## 2019-04-29 PROCEDURE — 99152 MOD SED SAME PHYS/QHP 5/>YRS: CPT | Performed by: INTERNAL MEDICINE

## 2019-04-29 PROCEDURE — 93458 L HRT ARTERY/VENTRICLE ANGIO: CPT

## 2019-04-29 PROCEDURE — C1894 INTRO/SHEATH, NON-LASER: HCPCS

## 2019-04-29 PROCEDURE — 80053 COMPREHEN METABOLIC PANEL: CPT

## 2019-04-29 PROCEDURE — 6360000002 HC RX W HCPCS

## 2019-04-29 PROCEDURE — G0378 HOSPITAL OBSERVATION PER HR: HCPCS

## 2019-04-29 PROCEDURE — 2500000003 HC RX 250 WO HCPCS

## 2019-04-29 PROCEDURE — C1769 GUIDE WIRE: HCPCS

## 2019-04-29 PROCEDURE — 6360000004 HC RX CONTRAST MEDICATION: Performed by: INTERNAL MEDICINE

## 2019-04-29 PROCEDURE — 2580000003 HC RX 258: Performed by: INTERNAL MEDICINE

## 2019-04-29 PROCEDURE — 84484 ASSAY OF TROPONIN QUANT: CPT

## 2019-04-29 PROCEDURE — 99152 MOD SED SAME PHYS/QHP 5/>YRS: CPT

## 2019-04-29 PROCEDURE — 85027 COMPLETE CBC AUTOMATED: CPT

## 2019-04-29 PROCEDURE — 93458 L HRT ARTERY/VENTRICLE ANGIO: CPT | Performed by: INTERNAL MEDICINE

## 2019-04-29 PROCEDURE — 80061 LIPID PANEL: CPT

## 2019-04-29 RX ORDER — HYDRALAZINE HYDROCHLORIDE 20 MG/ML
10 INJECTION INTRAMUSCULAR; INTRAVENOUS EVERY 10 MIN PRN
Status: DISCONTINUED | OUTPATIENT
Start: 2019-04-29 | End: 2019-04-29 | Stop reason: HOSPADM

## 2019-04-29 RX ORDER — SODIUM CHLORIDE 9 MG/ML
1000 INJECTION, SOLUTION INTRAVENOUS CONTINUOUS
Status: DISCONTINUED | OUTPATIENT
Start: 2019-04-29 | End: 2019-04-29 | Stop reason: HOSPADM

## 2019-04-29 RX ORDER — SODIUM CHLORIDE 0.9 % (FLUSH) 0.9 %
10 SYRINGE (ML) INJECTION PRN
Status: DISCONTINUED | OUTPATIENT
Start: 2019-04-29 | End: 2019-04-29 | Stop reason: HOSPADM

## 2019-04-29 RX ORDER — SODIUM CHLORIDE 0.9 % (FLUSH) 0.9 %
10 SYRINGE (ML) INJECTION EVERY 12 HOURS SCHEDULED
Status: DISCONTINUED | OUTPATIENT
Start: 2019-04-29 | End: 2019-04-29 | Stop reason: HOSPADM

## 2019-04-29 RX ORDER — ACETAMINOPHEN 325 MG/1
650 TABLET ORAL EVERY 4 HOURS PRN
Status: DISCONTINUED | OUTPATIENT
Start: 2019-04-29 | End: 2019-04-29 | Stop reason: HOSPADM

## 2019-04-29 RX ORDER — ONDANSETRON 2 MG/ML
4 INJECTION INTRAMUSCULAR; INTRAVENOUS EVERY 6 HOURS PRN
Status: DISCONTINUED | OUTPATIENT
Start: 2019-04-29 | End: 2019-04-29 | Stop reason: HOSPADM

## 2019-04-29 RX ADMIN — ACETAMINOPHEN 650 MG: 325 TABLET ORAL at 05:19

## 2019-04-29 RX ADMIN — LEVOTHYROXINE SODIUM 125 MCG: 75 TABLET ORAL at 05:16

## 2019-04-29 RX ADMIN — IOPAMIDOL 100 ML: 612 INJECTION, SOLUTION INTRAVENOUS at 12:48

## 2019-04-29 RX ADMIN — ASPIRIN 81 MG 81 MG: 81 TABLET ORAL at 09:02

## 2019-04-29 RX ADMIN — LISINOPRIL 2.5 MG: 5 TABLET ORAL at 09:02

## 2019-04-29 RX ADMIN — SODIUM CHLORIDE: 9 INJECTION, SOLUTION INTRAVENOUS at 10:51

## 2019-04-29 ASSESSMENT — PAIN DESCRIPTION - PAIN TYPE
TYPE: ACUTE PAIN
TYPE: ACUTE PAIN

## 2019-04-29 ASSESSMENT — PAIN DESCRIPTION - LOCATION
LOCATION: HEAD
LOCATION: HEAD

## 2019-04-29 ASSESSMENT — PAIN SCALES - GENERAL
PAINLEVEL_OUTOF10: 1
PAINLEVEL_OUTOF10: 0
PAINLEVEL_OUTOF10: 3
PAINLEVEL_OUTOF10: 0
PAINLEVEL_OUTOF10: 0

## 2019-04-29 NOTE — PLAN OF CARE
Problem: Falls - Risk of:  Goal: Will remain free from falls  Description  Will remain free from falls  4/29/2019 1005 by Abby Tidwell RN  Outcome: Ongoing     Problem: Falls - Risk of:  Goal: Absence of physical injury  Description  Absence of physical injury  4/29/2019 1005 by Abby Tidwell RN  Outcome: Ongoing     Problem: Pain:  Goal: Pain level will decrease  Description  Pain level will decrease  4/29/2019 1005 by Abby Tidwell RN  Outcome: Ongoing     Problem: Pain:  Goal: Control of acute pain  Description  Control of acute pain  4/29/2019 1005 by Abby Tidwell RN  Outcome: Ongoing     Problem: Pain:  Goal: Control of chronic pain  Description  Control of chronic pain  4/29/2019 1005 by Abby Tidwell RN  Outcome: Ongoing

## 2019-04-29 NOTE — PROGRESS NOTES
Mir Bronson arrived to room # [phone]. Presented with: chest pain  Mental Status: Patient is oriented, alert and coherent. Vitals:    04/28/19 1907   BP: 131/79   Pulse: 81   Resp: 20   Temp: 98.1 °F (36.7 °C)   SpO2: 93%     Patient safety contract and falls prevention contract reviewed with patient Yes. Oriented Patient to room. Call light within reach. Yes.   Needs, issues or concerns expressed at this time: no.      Electronically signed by Ilya Manzano RN on 4/28/2019 at 8:02 PM

## 2019-04-29 NOTE — PROGRESS NOTES
Last of the air in the tr band removed (3 mls) no bruising, swelling, or bleeding noted pulses palpable

## 2019-04-29 NOTE — H&P
[facility] [address] [facility]                              HISTORY AND PHYSICAL    PATIENT NAME: Humble Bernard                       :        1960  MED REC NO:   [id number]                              ROOM:       [facility]  ACCOUNT NO:   [de-identified]                           ADMIT DATE: 2019  PROVIDER:     Jacquelin Lennox, MD    HISTORY OF PRESENT ILLNESS:  This is a 63-year-old admitted with chest  pain, pressure, left shoulder and jaw pain, reminiscent of her pain when  she had an MI and stent placement. She also had some nausea, vomiting,  diaphoresis and breathlessness, all started yesterday. She woke up  feeling fine. Went to bed feeling fine. No other signs or symptoms in  the last few days. PAST MEDICAL HISTORY:  Her past medical history is significant for  previous MI, previous stent that was in ; history of hypertension,  fibromyalgia, hyperlipidemia, sleep apnea. She has had sinus surgery. She has ankle fracture repair surgery in . She has had carpal  tunnel release on both wrists in 2016. She has had heart cath and  stent placement in 2013. She has psoriatic arthritis followed by Dr. Tami Durán. ALLERGIES:  She has allergies to CHANTIX and MORPHINE. SOCIAL HISTORY:  She is . She is a former smoker, quit in . FAMILY HISTORY:  Positive for heart disease. MEDICATIONS:  Her medicines on admission include Synthroid 125 daily;  duloxetine 20 mg daily; Plavix 75 daily; Prinivil 2.5 daily;  nitroglycerine as needed, sublingually; Zofran 4 mg every 8 as needed;  Zocor 40 mg nightly; Rheumatrex 2.5 mg, she takes 6 of those tablets  weekly; folic acid 1 mg daily; aspirin 81 daily; bisoprolol/HCT 5/6.25  one daily. PHYSICAL EXAMINATION:  VITAL SIGNS:  Blood pressure is 134/74, pulse 70, respirations 20,  temperature 98. 1. HEENT:  Normocephalic, atraumatic. NECK:  Supple.   No bruits. CHEST:  Clear. HEART:  Regular. ABDOMEN:  Benign. EXTREMITIES:  No clubbing, cyanosis or edema. NEUROLOGIC:  Cranial nerves intact. Gross motor and sensory intact. LABORATORY DATA:  Sodium 141, potassium 3.9, chloride 101, CO2 is 28. BUN 25, creatinine is 0.5. Anion gap is 12. GFR is greater than 60. Glucose 178. Calcium is 9.8. Total protein 7.4. Troponin is less than  0.01. LFTs were normal.  White count 7.9, hemoglobin 13.1, platelets  are 168. X-ray of the chest was negative for anything acute. ASSESSMENT:  1. Chest pain suspicious for angina in a patient with previous MI,  previous stent. 2.  Hyperlipidemia. 3.  Hypertension. 4.  Prior smoker, but she quit 6 years ago. 5.  Psoriatic arthritis. 6.  Hypothyroidism. PLAN:  Dr. Lisa Barragan has seen her and plans to do heart cath this  morning. We will follow along for any further changes.         Daron Soriano MD    D: 04/29/2019 7:09:11      T: 04/29/2019 12:53:15     /[email or web address]  Job#: [id number]     Doc#: [id number]    CC:

## 2019-04-30 NOTE — DISCHARGE SUMMARY
[facility] [address] [facility]                               DISCHARGE SUMMARY    PATIENT NAME: Humble Bernard                       :        1960  MED REC NO:   [id number]                              ROOM:       [facility]  ACCOUNT NO:   [de-identified]                           ADMIT DATE: 2019  PROVIDER:     Jacquelin Lennox, MD                   [facility] DATE: 2019    HISTORY AND HOSPITAL COURSE:  This is a 79-year-old admitted for  noncardiac chest pain. She just had her left heart cath, which was  normal.  It showed some mild noncritical coronary artery disease and a  patent stent to the LAD. She is without chest pain. She is feeling  well and without any complaints. She is felt stable for discharge home. PROCEDURES DURING THE ADMISSION:  She had left heart cath as mentioned  above. She had a chest x-ray, which was normal.  EKG did not show any  acute findings. She was stable. Her vital signs were stable, and she  was felt ready for discharge. DISCHARGE MEDICATIONS:  Her medications on discharge will be aspirin 81  mg daily; nitroglycerin sublingual as needed; Cymbalta 20 mg daily;  Zofran 4 mg every 8 hours as needed; simvastatin 40 mg daily; Ziac 5 mg  once daily; Prinivil 2.5 mg daily; methotrexate, she takes six 2.5 mg  tablets every week on Saturday; folic acid 1 mg daily; Plavix 75 mg  daily; and Synthroid 125 mcg daily. She will be discharged home in good condition. We will see her in the  office in 7 to 10 days for hospital followup, sooner for problems. She  should follow the routine post-cath orders for Dr. Dumont's radial  artery approach heart cath and follow up with him as he directs.         Denisse Man MD    D: 2019 16:30:55      T: 2019 3:36:02     /[email or web address]  Job#: [id number]     Doc#: [id number]    CC:

## 2019-05-01 ENCOUNTER — TELEPHONE (OUTPATIENT)
Dept: CARDIOLOGY | Age: 59
End: 2019-05-01

## 2019-05-03 NOTE — TELEPHONE ENCOUNTER
Can we get patient an appointment with Dr. Lucia Antoine towards the end of the month? If that means an overbook that is fine.

## 2019-07-01 ENCOUNTER — TELEPHONE (OUTPATIENT)
Dept: CARDIOLOGY | Age: 59
End: 2019-07-01

## 2019-07-18 ENCOUNTER — OFFICE VISIT (OUTPATIENT)
Dept: CARDIOLOGY | Age: 59
End: 2019-07-18
Payer: COMMERCIAL

## 2019-07-18 VITALS
HEIGHT: 66 IN | HEART RATE: 80 BPM | DIASTOLIC BLOOD PRESSURE: 70 MMHG | WEIGHT: 212 LBS | BODY MASS INDEX: 34.07 KG/M2 | SYSTOLIC BLOOD PRESSURE: 108 MMHG

## 2019-07-18 DIAGNOSIS — Z95.5 H/O HEART ARTERY STENT: ICD-10-CM

## 2019-07-18 DIAGNOSIS — I10 ESSENTIAL HYPERTENSION: Primary | ICD-10-CM

## 2019-07-18 DIAGNOSIS — I25.10 CORONARY ARTERY DISEASE INVOLVING NATIVE CORONARY ARTERY OF NATIVE HEART WITHOUT ANGINA PECTORIS: ICD-10-CM

## 2019-07-18 DIAGNOSIS — E78.2 MIXED HYPERLIPIDEMIA: ICD-10-CM

## 2019-07-18 PROCEDURE — 3017F COLORECTAL CA SCREEN DOC REV: CPT | Performed by: INTERNAL MEDICINE

## 2019-07-18 PROCEDURE — G8417 CALC BMI ABV UP PARAM F/U: HCPCS | Performed by: INTERNAL MEDICINE

## 2019-07-18 PROCEDURE — 99213 OFFICE O/P EST LOW 20 MIN: CPT | Performed by: INTERNAL MEDICINE

## 2019-07-18 PROCEDURE — G8427 DOCREV CUR MEDS BY ELIG CLIN: HCPCS | Performed by: INTERNAL MEDICINE

## 2019-07-18 PROCEDURE — 1036F TOBACCO NON-USER: CPT | Performed by: INTERNAL MEDICINE

## 2019-07-18 PROCEDURE — G8598 ASA/ANTIPLAT THER USED: HCPCS | Performed by: INTERNAL MEDICINE

## 2019-07-18 RX ORDER — CITALOPRAM 40 MG/1
40 TABLET ORAL DAILY
COMMUNITY

## 2019-07-18 RX ORDER — PANTOPRAZOLE SODIUM 40 MG/1
40 TABLET, DELAYED RELEASE ORAL NIGHTLY
COMMUNITY

## 2019-07-18 ASSESSMENT — ENCOUNTER SYMPTOMS
NAUSEA: 0
VOMITING: 0
EYES NEGATIVE: 1
DIARRHEA: 0
SHORTNESS OF BREATH: 0
RESPIRATORY NEGATIVE: 1
GASTROINTESTINAL NEGATIVE: 1

## 2019-07-18 NOTE — PROGRESS NOTES
Fibromyalgia     HTN (hypertension)     Hyperlipidemia     Mixed hyperlipidemia 7/19/2017    Post-menopausal     Psoriatic arthritis ([facility] [address]     Sleep apnea     cpap     Past Surgical History:   Procedure Laterality Date    CARDIAC CATHETERIZATION  7/28/13  MDL    angioplasty, stent to LAD. EF 20-25%    CARPAL TUNNEL RELEASE Bilateral 12/27/2016    CARPAL TUNNEL RELEASE performed by Obdulia Watson [facility] at [facility] OR    ORTHOPEDIC SURGERY      ankle fx. repair 1994    SINUS SURGERY       Family History   Problem Relation Age of Onset    Heart Disease Father      Allergies   Allergen Reactions    Chantix [Varenicline] Hives    Morphine      Current Outpatient Medications   Medication Sig Dispense Refill    citalopram (CELEXA) 40 MG tablet Take 40 mg by mouth daily      pantoprazole (PROTONIX) 40 MG tablet Take 40 mg by mouth daily      levothyroxine (SYNTHROID) 125 MCG tablet Take 125 mcg by mouth Daily      clopidogrel (PLAVIX) 75 MG tablet Take 1 tablet by mouth daily 90 tablet 3    lisinopril (PRINIVIL;ZESTRIL) 2.5 MG tablet Take 1 tablet by mouth daily 90 tablet 3    nitroGLYCERIN (NITROSTAT) 0.4 MG SL tablet Place 1 tablet under the tongue every 5 minutes as needed for Chest pain 25 tablet 3    ondansetron (ZOFRAN) 4 MG tablet Take 1 tablet by mouth every 8 hours as needed for Nausea or Vomiting 10 tablet 0    simvastatin (ZOCOR) 40 MG tablet Take 1 tablet by mouth nightly (Patient taking differently: Take 40 mg by mouth nightly Indications: Blood Cholesterol Abnormal ) 90 tablet 3    methotrexate (RHEUMATREX) 2.5 MG chemo tablet Take 2.5 mg by mouth once a week Indications: Psoriasis associated with Arthritis, on saturdays Take 6 tablets weekly      folic acid (FOLVITE) 1 MG tablet Take 1 mg by mouth daily      aspirin 81 MG tablet Take 81 mg by mouth daily.       bisoprolol-hydrochlorothiazide (ZIAC) 5-6.25 MG per tablet Take 1 tablet by mouth daily Indications: High Blood Pressure        No

## 2019-07-22 DIAGNOSIS — I10 ESSENTIAL HYPERTENSION: ICD-10-CM

## 2019-07-22 DIAGNOSIS — I25.10 CORONARY ARTERY DISEASE INVOLVING NATIVE CORONARY ARTERY OF NATIVE HEART WITHOUT ANGINA PECTORIS: ICD-10-CM

## 2019-07-22 RX ORDER — CLOPIDOGREL BISULFATE 75 MG/1
75 TABLET ORAL DAILY
Qty: 90 TABLET | Refills: 3 | Status: SHIPPED | OUTPATIENT
Start: 2019-07-22 | End: 2019-09-25 | Stop reason: SDUPTHER

## 2019-07-22 RX ORDER — LISINOPRIL 2.5 MG/1
2.5 TABLET ORAL DAILY
Qty: 90 TABLET | Refills: 3 | Status: SHIPPED | OUTPATIENT
Start: 2019-07-22 | End: 2020-04-06

## 2019-09-25 DIAGNOSIS — I25.10 CORONARY ARTERY DISEASE INVOLVING NATIVE CORONARY ARTERY OF NATIVE HEART WITHOUT ANGINA PECTORIS: ICD-10-CM

## 2019-09-25 RX ORDER — CLOPIDOGREL BISULFATE 75 MG/1
75 TABLET ORAL DAILY
Qty: 90 TABLET | Refills: 3 | Status: SHIPPED | OUTPATIENT
Start: 2019-09-25 | End: 2020-04-06

## 2020-02-06 ENCOUNTER — OFFICE VISIT (OUTPATIENT)
Dept: CARDIOLOGY | Age: 60
End: 2020-02-06

## 2020-02-06 VITALS
DIASTOLIC BLOOD PRESSURE: 82 MMHG | HEIGHT: 66 IN | BODY MASS INDEX: 33.59 KG/M2 | HEART RATE: 80 BPM | SYSTOLIC BLOOD PRESSURE: 108 MMHG | WEIGHT: 209 LBS

## 2020-02-06 PROCEDURE — 99213 OFFICE O/P EST LOW 20 MIN: CPT | Performed by: CLINICAL NURSE SPECIALIST

## 2020-02-06 ASSESSMENT — ENCOUNTER SYMPTOMS
SHORTNESS OF BREATH: 0
COUGH: 0
VOMITING: 0
EYE REDNESS: 0
CHEST TIGHTNESS: 0
ABDOMINAL PAIN: 0
NAUSEA: 0
FACIAL SWELLING: 0
WHEEZING: 0

## 2020-02-06 NOTE — PROGRESS NOTES
Morphine    Review of Systems  Review of Systems   Constitutional: Positive for fatigue. Negative for activity change, diaphoresis, fever and unexpected weight change. HENT: Negative for facial swelling and nosebleeds. Eyes: Negative for redness and visual disturbance. Respiratory: Negative for cough, chest tightness, shortness of breath and wheezing. Cardiovascular: Negative for chest pain, palpitations and leg swelling. Gastrointestinal: Negative for abdominal pain, nausea and vomiting. Endocrine: Negative for cold intolerance and heat intolerance. Genitourinary: Negative for dysuria and hematuria. Musculoskeletal: Negative for arthralgias and myalgias. Skin: Negative for pallor and rash. Neurological: Negative for dizziness, seizures, syncope, weakness and light-headedness. Hematological: Does not bruise/bleed easily. Psychiatric/Behavioral: Negative for agitation. The patient is not nervous/anxious. Objective  Vital Signs - /82   Pulse 80   Ht 5' 6\" (1.676 m)   Wt 209 lb (94.8 kg)   Breastfeeding No   BMI 33.73 kg/m²   Physical Exam  Vitals signs and nursing note reviewed. Constitutional:       General: She is not in acute distress. Appearance: Normal appearance. She is well-developed. She is not diaphoretic. HENT:      Head: Normocephalic and atraumatic. Right Ear: Hearing and external ear normal.      Left Ear: Hearing and external ear normal.      Nose: Nose normal.   Eyes:      General:         Right eye: No discharge. Left eye: No discharge. Pupils: Pupils are equal, round, and reactive to light. Neck:      Musculoskeletal: Neck supple. No muscular tenderness. Thyroid: No thyromegaly. Vascular: No carotid bruit or JVD. Trachea: No tracheal deviation. Cardiovascular:      Rate and Rhythm: Normal rate and regular rhythm. Heart sounds: Normal heart sounds. No murmur. No friction rub. No gallop.        Comments: No carotid bruit  Pulmonary:      Effort: Pulmonary effort is normal. No respiratory distress. Breath sounds: Normal breath sounds. No wheezing or rales. Abdominal:      Palpations: Abdomen is soft. Tenderness: There is no abdominal tenderness. Musculoskeletal:         General: No swelling or deformity. Comments: Normal gait and station   Skin:     General: Skin is warm and dry. Findings: No rash. Neurological:      General: No focal deficit present. Mental Status: She is alert and oriented to person, place, and time. Cranial Nerves: No cranial nerve deficit. Psychiatric:         Mood and Affect: Mood normal.         Behavior: Behavior normal.         Judgment: Judgment normal.         Data:  Heart Cath 4/19  Conclusions      Normal LV systolic function. Mild noncritical coronary artery disease   Patent stent proximal LAD      Recommendations      Routine Post Diagnostic Cath orders. Risk factor modification. Lab Results   Component Value Date    CHOL 145 (L) 04/29/2019    TRIG 218 (H) 04/29/2019    HDL 42 (L) 04/29/2019    LDLCALC 59 04/29/2019    LDLDIRECT 77 04/27/2014     Lab Results   Component Value Date    ALT 13 04/29/2019    AST 10 04/29/2019     Assessment:     Diagnosis Orders   1. Coronary artery disease involving native coronary artery of native heart without angina pectoris     2. Essential hypertension     3. Mixed hyperlipidemia     4. Class 1 obesity due to excess calories with serious comorbidity and body mass index (BMI) of 32.0 to 32.9 in adult        CAD-stable without symptoms of angina    Hypertension-well-controlled on current regimen    Hyperlipidemia-on statin therapy. Reviewed lipids done 4/19 which are at goal with the exception of her triglycerides. We discussed limiting sugars and starches to lower triglycerides    Obesity-encouraged regular exercise and weight loss    Stable cardiovascular status.  No evidence of overt heart failure,angina or

## 2020-02-06 NOTE — PATIENT INSTRUCTIONS
Return in about 9 months (around 11/6/2020) for Dr. Michael Nguyen. Encourage weight loss and regular exercise    Call with any questionsor concerns  Follow up with Silverio Stephens MD for non cardiac problems  Report any new problems  Cardiovascular Fitness-Exercise as tolerated. Strive for 15 minutes of exercise most days of the week. Cardiac / HealthyDiet  Continue current medications as directed  Continue plan of treatment  It is always recommended that you bring your medicationsbottles with you to each visit - this is for your safety!

## 2020-03-02 NOTE — TELEPHONE ENCOUNTER
Called to confirm apt, left msg to ret call to confirm cancellation of apt & rs apt. Last OV: 11/19/19  Next OV: 5/21/20  Labs: SHUKRI

## 2020-04-06 RX ORDER — CLOPIDOGREL BISULFATE 75 MG/1
TABLET ORAL
Qty: 90 TABLET | Refills: 0 | Status: SHIPPED | OUTPATIENT
Start: 2020-04-06 | End: 2022-06-27

## 2020-04-06 RX ORDER — LISINOPRIL 2.5 MG/1
TABLET ORAL
Qty: 90 TABLET | Refills: 0 | Status: SHIPPED | OUTPATIENT
Start: 2020-04-06 | End: 2021-01-18

## 2020-11-05 ENCOUNTER — OFFICE VISIT (OUTPATIENT)
Dept: CARDIOLOGY | Age: 60
End: 2020-11-05
Payer: COMMERCIAL

## 2020-11-05 VITALS
WEIGHT: 207 LBS | HEIGHT: 66 IN | BODY MASS INDEX: 33.27 KG/M2 | DIASTOLIC BLOOD PRESSURE: 74 MMHG | OXYGEN SATURATION: 98 % | SYSTOLIC BLOOD PRESSURE: 118 MMHG | HEART RATE: 62 BPM

## 2020-11-05 PROCEDURE — 2022F DILAT RTA XM EVC RTNOPTHY: CPT | Performed by: CLINICAL NURSE SPECIALIST

## 2020-11-05 PROCEDURE — G8417 CALC BMI ABV UP PARAM F/U: HCPCS | Performed by: CLINICAL NURSE SPECIALIST

## 2020-11-05 PROCEDURE — G8427 DOCREV CUR MEDS BY ELIG CLIN: HCPCS | Performed by: CLINICAL NURSE SPECIALIST

## 2020-11-05 PROCEDURE — 93000 ELECTROCARDIOGRAM COMPLETE: CPT | Performed by: CLINICAL NURSE SPECIALIST

## 2020-11-05 PROCEDURE — 3017F COLORECTAL CA SCREEN DOC REV: CPT | Performed by: CLINICAL NURSE SPECIALIST

## 2020-11-05 PROCEDURE — 3046F HEMOGLOBIN A1C LEVEL >9.0%: CPT | Performed by: CLINICAL NURSE SPECIALIST

## 2020-11-05 PROCEDURE — 1036F TOBACCO NON-USER: CPT | Performed by: CLINICAL NURSE SPECIALIST

## 2020-11-05 PROCEDURE — 99213 OFFICE O/P EST LOW 20 MIN: CPT | Performed by: CLINICAL NURSE SPECIALIST

## 2020-11-05 PROCEDURE — G8484 FLU IMMUNIZE NO ADMIN: HCPCS | Performed by: CLINICAL NURSE SPECIALIST

## 2020-11-05 RX ORDER — ESOMEPRAZOLE MAGNESIUM 40 MG/1
40 FOR SUSPENSION ORAL DAILY
COMMUNITY
End: 2022-06-27 | Stop reason: ALTCHOICE

## 2020-11-05 ASSESSMENT — ENCOUNTER SYMPTOMS
VOMITING: 0
SHORTNESS OF BREATH: 0
CHEST TIGHTNESS: 0
WHEEZING: 0
ABDOMINAL PAIN: 0
EYE REDNESS: 0
NAUSEA: 0
FACIAL SWELLING: 0
COUGH: 0

## 2020-11-05 NOTE — PATIENT INSTRUCTIONS
Return in about 9 months (around 11/5/2029) for APRN. Encourage weight loss and regular exercise  Keep diabetes under control to help prevent further heart disease. Keep Hemoglobin A1C less than 7%. Call with any questionsor concerns  Follow up with Elier Buchanan MD for non cardiac problems  Report any new problems  Cardiovascular Fitness-Exercise as tolerated. Strive for 15 minutes of exercise most days of the week. Cardiac / HealthyDiet  Continue current medications as directed  Continue plan of treatment  It is always recommended that you bring your medicationsbottles with you to each visit - this is for your safety!

## 2020-11-05 NOTE — PROGRESS NOTES
[facility], [address], [address]  [address]  Phone: [phone]  Fax: [phone]    OFFICE VISIT:  2020    Laura Locke - : 1960    Reason For Visit:  Tasha Shi is a 61 y.o. female who is here for Follow-up (no cardiax sx today); Coronary Artery Disease; and Hypertension       Diagnosis Orders   1. Coronary artery disease involving native coronary artery of native heart without angina pectoris  EKG 12 lead   2. Essential hypertension  EKG 12 lead   3. Mixed hyperlipidemia     4. Class 1 obesity due to excess calories with serious comorbidity and body mass index (BMI) of 32.0 to 32.9 in adult     5. Type 2 diabetes mellitus without complication, without long-term current use of insulin (HCC)         HPI  Patient is here follow-up with a history of CAD. Last heart cath on 2019 showed a patent stent and normal LV function with minimal disease. She denies chest pain, dyspnea, orthopnea, PND, edema, palpitations. She works at [employer] and is active on her job with a lot of walking and movement. She tolerates this well. She has been diagnosed with diabetes since her last visit here     Maximus Dukes MD is PCP.   Laura Locke has the following history as recorded in [facility]:    Patient Active Problem List    Diagnosis Date Noted    H/O heart artery stent 2019     Priority: High    Chest discomfort 2019    Chest pain 2019    Class 1 obesity due to excess calories in adult 2019    Mixed hyperlipidemia 2017    Bilateral carpal tunnel syndrome 2016    CAD (coronary artery disease) 2013    HTN (hypertension)      Past Medical History:   Diagnosis Date    CAD (coronary artery disease) 2013    EF 20-25% , EF 50-55%     Carpal tunnel syndrome     Diabetes mellitus ([facility] [address]     Fibromyalgia     HTN (hypertension)     Hyperlipidemia     Mixed hyperlipidemia 2017    Post-menopausal     P[facility]iatic arthritis ([facility] [address]     Sleep apnea     cpap     Past Surgical History:   Procedure Laterality Date    CARDIAC CATHETERIZATION  13  MDL    angioplasty, stent to LAD. EF 20-25%    CARPAL TUNNEL RELEASE Bilateral 2016    CARPAL TUNNEL RELEASE performed by Xuan Woodard MD at [address]      ankle fx. repair     SINUS SURGERY       Family History   Problem Relation Age of Onset    Heart Disease Father      Social History     Tobacco Use    Smoking status: Former Smoker     Packs/day: 2.00     Years: 30.00     Pack years: 60.00     Types: Cigarettes     Last attempt to quit: 2013     Years since quittin.2    Smokeless tobacco: Never Used   Substance Use Topics    Alcohol use: No      Current Outpatient Medications   Medication Sig Dispense Refill    esomeprazole Magnesium (NEXIUM) 40 MG PACK Take 40 mg by mouth daily      metFORMIN (GLUCOPHAGE) 500 MG tablet Take 500 mg by mouth 2 times daily (with meals)      clopidogrel (PLAVIX) 75 MG tablet Take 1 tablet by mouth once daily 90 tablet 0    lisinopril (PRINIVIL;ZESTRIL) 2.5 MG tablet Take 1 tablet by mouth once daily 90 tablet 0    citalopram (CELEXA) 40 MG tablet Take 40 mg by mouth daily      levothyroxine (SYNTHROID) 125 MCG tablet Take 125 mcg by mouth Daily      nitroGLYCERIN (NITROSTAT) 0.4 MG SL tablet Place 1 tablet under the tongue every 5 minutes as needed for Chest pain 25 tablet 3    ondansetron (ZOFRAN) 4 MG tablet Take 1 tablet by mouth every 8 hours as needed for Nausea or Vomiting 10 tablet 0    simvastatin (ZOCOR) 40 MG tablet Take 1 tablet by mouth nightly (Patient taking differently: Take 40 mg by mouth nightly Indications: Blood Cholesterol Abnormal ) 90 tablet 3    methotrexate (RHEUMATREX) 2.5 MG chemo tablet Take 2.5 mg by mouth once a week Indications: Psoriasis associated with Arthritis, on  Take 6 tablets weekly      folic acid (FOLVITE) 1 MG tablet Take 1 mg by mouth daily      aspirin 81 MG tablet Take 81 mg by mouth daily.  bisoprolol-hydrochlorothiazide (ZIAC) 5-6.25 MG per tablet Take 1 tablet by mouth daily Indications: High Blood Pressure       pantoprazole (PROTONIX) 40 MG tablet Take 40 mg by mouth daily       No current facility-administered medications for this visit. Allergies: Chantix [varenicline] and Morphine    Review of Systems  Review of Systems   Constitutional: Positive for fatigue. Negative for activity change, diaphoresis, fever and unexpected weight change. HENT: Negative for facial swelling and nosebleeds. Eyes: Negative for redness and visual disturbance. Respiratory: Negative for cough, chest tightness, shortness of breath and wheezing. Cardiovascular: Negative for chest pain, palpitations and leg swelling. Gastrointestinal: Negative for abdominal pain, nausea and vomiting. Endocrine: Negative for cold intolerance and heat intolerance. Genitourinary: Negative for dysuria and hematuria. Musculoskeletal: Negative for arthralgias and myalgias. Skin: Negative for pallor and rash. Neurological: Negative for dizziness, seizures, syncope, weakness and light-headedness. Hematological: Does not bruise/bleed easily. Psychiatric/Behavioral: Negative for agitation. The patient is not nervous/anxious. Objective  Vital Signs - /74   Pulse 62   Ht 5' 6\" (1.676 m)   Wt 207 lb (93.9 kg)   SpO2 98%   BMI 33.41 kg/m²    Wt Readings from Last 3 Encounters:   11/05/20 207 lb (93.9 kg)   02/06/20 209 lb (94.8 kg)   07/18/19 212 lb (96.2 kg)      Physical Exam  Vitals signs and nursing note reviewed. Constitutional:       General: She is not in acute distress. Appearance: She is well-developed. She is obese. She is not diaphoretic. HENT:      Head: Normocephalic and atraumatic.       Right Ear: Hearing and external ear normal.      Left Ear: Hearing and external ear normal.      Nose: Nose normal.   Eyes: General:         Right eye: No discharge. Left eye: No discharge. Pupils: Pupils are equal, round, and reactive to light. Neck:      Musculoskeletal: Neck supple. No muscular tenderness. Thyroid: No thyromegaly. Vascular: No carotid bruit or JVD. Trachea: No tracheal deviation. Cardiovascular:      Rate and Rhythm: Normal rate and regular rhythm. Heart sounds: Normal heart sounds. No murmur. No friction rub. No gallop. Comments: No carotid bruit  Pulmonary:      Effort: Pulmonary effort is normal. No respiratory distress. Breath sounds: Normal breath sounds. No wheezing or rales. Abdominal:      Palpations: Abdomen is soft. Tenderness: There is no abdominal tenderness. Musculoskeletal:         General: No swelling or deformity. Comments: Normal gait and station   Skin:     General: Skin is warm and dry. Findings: No rash. Neurological:      General: No focal deficit present. Mental Status: She is alert and oriented to person, place, and time. Cranial Nerves: No cranial nerve deficit. Psychiatric:         Mood and Affect: Mood normal.         Behavior: Behavior normal.         Judgment: Judgment normal.         Data:  Heart Cath 4/19  Conclusions      Normal LV systolic function. Mild noncritical coronary artery disease   Patent stent proximal LAD      Recommendations      Routine Post Diagnostic Cath orders. Risk factor modification. Lab Results   Component Value Date    CHOL 145 (L) 04/29/2019    TRIG 218 (H) 04/29/2019    HDL 42 (L) 04/29/2019    LDLCALC 59 04/29/2019    LDLDIRECT 77 04/27/2014     Lab Results   Component Value Date    ALT 13 04/29/2019    AST 10 04/29/2019     Assessment:     Diagnosis Orders   1. Coronary artery disease involving native coronary artery of native heart without angina pectoris  EKG 12 lead   2. Essential hypertension  EKG 12 lead   3. Mixed hyperlipidemia     4.  Class 1 obesity due to excess calories with serious comorbidity and body mass index (BMI) of 32.0 to 32.9 in adult     5. Type 2 diabetes mellitus without complication, without long-term current use of insulin (HCC)          CAD-stable without symptoms of angina    Hypertension-well-controlled on current regimen    Hyperlipidemia-on statin therapy. Reviewed lipids done 4/19 which are at goal with the exception of her triglycerides. We discussed limiting sugars and starches to lower triglycerides    Obesity-encouraged regular exercise and weight loss    Diabetes-new diagnosis. Recently started Metformin. Return blood sugar at home. Discussed A1c goal less than 7%. Discussed relationship between diabetes and heart disease. Stable cardiovascular status. No evidence of overt heart failure,angina or dysrhythmia. Plan    Return in about 9 months (around 8/5/2021) for SO. Encourage weight loss and regular exercise  Keep diabetes under control to help prevent further heart disease. Keep Hemoglobin A1C less than 7%. Call with any questionsor concerns  Follow up with Wil Garcia MD for non cardiac problems  Report any new problems  Cardiovascular Fitness-Exercise as tolerated. Strive for 15 minutes of exercise most days of the week. Cardiac / HealthyDiet  Continue current medications as directed  Continue plan of treatment  It is always recommended that you bring your medicationsbottles with you to each visit - this is for your safety!        SO Beal

## 2021-01-18 DIAGNOSIS — I10 ESSENTIAL HYPERTENSION: ICD-10-CM

## 2021-01-18 RX ORDER — LISINOPRIL 2.5 MG/1
TABLET ORAL
Qty: 90 TABLET | Refills: 3 | Status: SHIPPED | OUTPATIENT
Start: 2021-01-18 | End: 2022-06-27

## 2021-10-14 ENCOUNTER — OFFICE VISIT (OUTPATIENT)
Dept: CARDIOLOGY CLINIC | Age: 61
End: 2021-10-14
Payer: COMMERCIAL

## 2021-10-14 VITALS
WEIGHT: 212 LBS | HEART RATE: 68 BPM | BODY MASS INDEX: 34.07 KG/M2 | OXYGEN SATURATION: 94 % | HEIGHT: 66 IN | DIASTOLIC BLOOD PRESSURE: 84 MMHG | SYSTOLIC BLOOD PRESSURE: 112 MMHG

## 2021-10-14 DIAGNOSIS — I10 ESSENTIAL HYPERTENSION: ICD-10-CM

## 2021-10-14 DIAGNOSIS — L40.50 PSORIATIC ARTHRITIS (HCC): ICD-10-CM

## 2021-10-14 DIAGNOSIS — E11.9 TYPE 2 DIABETES MELLITUS WITHOUT COMPLICATION, WITHOUT LONG-TERM CURRENT USE OF INSULIN (HCC): ICD-10-CM

## 2021-10-14 DIAGNOSIS — I51.9 LEFT VENTRICULAR SYSTOLIC DYSFUNCTION: ICD-10-CM

## 2021-10-14 DIAGNOSIS — E78.2 MIXED HYPERLIPIDEMIA: ICD-10-CM

## 2021-10-14 DIAGNOSIS — M79.7 FIBROMYALGIA: ICD-10-CM

## 2021-10-14 DIAGNOSIS — I25.10 CORONARY ARTERY DISEASE INVOLVING NATIVE CORONARY ARTERY OF NATIVE HEART WITHOUT ANGINA PECTORIS: Primary | ICD-10-CM

## 2021-10-14 PROBLEM — R07.89 CHEST DISCOMFORT: Status: RESOLVED | Noted: 2019-04-28 | Resolved: 2021-10-14

## 2021-10-14 PROBLEM — R07.9 CHEST PAIN: Status: RESOLVED | Noted: 2019-04-28 | Resolved: 2021-10-14

## 2021-10-14 PROCEDURE — 1036F TOBACCO NON-USER: CPT | Performed by: CLINICAL NURSE SPECIALIST

## 2021-10-14 PROCEDURE — 3017F COLORECTAL CA SCREEN DOC REV: CPT | Performed by: CLINICAL NURSE SPECIALIST

## 2021-10-14 PROCEDURE — 2022F DILAT RTA XM EVC RTNOPTHY: CPT | Performed by: CLINICAL NURSE SPECIALIST

## 2021-10-14 PROCEDURE — G8417 CALC BMI ABV UP PARAM F/U: HCPCS | Performed by: CLINICAL NURSE SPECIALIST

## 2021-10-14 PROCEDURE — 99214 OFFICE O/P EST MOD 30 MIN: CPT | Performed by: CLINICAL NURSE SPECIALIST

## 2021-10-14 PROCEDURE — 3046F HEMOGLOBIN A1C LEVEL >9.0%: CPT | Performed by: CLINICAL NURSE SPECIALIST

## 2021-10-14 PROCEDURE — G8427 DOCREV CUR MEDS BY ELIG CLIN: HCPCS | Performed by: CLINICAL NURSE SPECIALIST

## 2021-10-14 PROCEDURE — G8484 FLU IMMUNIZE NO ADMIN: HCPCS | Performed by: CLINICAL NURSE SPECIALIST

## 2021-10-14 ASSESSMENT — ENCOUNTER SYMPTOMS
CHEST TIGHTNESS: 0
SHORTNESS OF BREATH: 0
VOMITING: 0
EYE REDNESS: 0
ABDOMINAL PAIN: 0
COUGH: 0
NAUSEA: 0
WHEEZING: 0
FACIAL SWELLING: 0

## 2021-10-14 NOTE — PROGRESS NOTES
[facility], [address], [address]  [address]  Phone: [phone]  Fax: [phone]    OFFICE VISIT:  10/14/2021    Navdeep Mccloud - : 1960    Reason For Visit:  Rayne Ritter is a 64 y.o. adult who is here for Follow-up, Coronary Artery Disease, and Hypertension       Diagnosis Orders   1. Coronary artery disease involving native coronary artery of native heart without angina pectoris     2. Left ventricular systolic dysfunction     3. Essential hypertension     4. Mixed hyperlipidemia     5. Type 2 diabetes mellitus without complication, without long-term current use of insulin ([address]     6. Psoriatic arthritis ([address]     7. Fibromyalgia         HPI  Patient is here follow-up with a history of CAD (stent LAD , w LVEF 20%)  Last heart cath on 2019 showed a patent stent and normal LV function with minimal disease. She denies chest pain, dyspnea, orthopnea, PND, edema, palpitations. She works at [employer] and is active on her job with a lot of walking and movement. She tolerates this well. She has been diagnosed with diabetes in the last year    She sees a rheumatologist with psoriatic arthritis and has recently been diagnosed with fibromyalgia. She is wondering if it is okay to start Lyrica from a cardiac standpoint     Wili Dupont MD is PCP.   Navdeep Mccloud has the following history as recorded in [facility]:    Patient Active Problem List    Diagnosis Date Noted    H/O heart artery stent 2019    Psoriatic arthritis ([address] 10/14/2021    Fibromyalgia 10/14/2021    Type 2 diabetes mellitus without complication, without long-term current use of insulin ([address] 10/14/2021    Class 1 obesity due to excess calories in adult 2019    Mixed hyperlipidemia 2017    Bilateral carpal tunnel syndrome 2016    CAD (coronary artery disease) 2013    HTN (hypertension)      Past Medical History:   Diagnosis Date    CAD (coronary artery disease) 2013    EF 20-25% , EF 50-55%     Carpal tunnel syndrome     Diabetes mellitus ([facility] [address]     Fibromyalgia     HTN (hypertension)     Hyperlipidemia     Mixed hyperlipidemia 2017    Post-menopausal     Psoriatic arthritis ([facility]     Sleep apnea     cpap     Past Surgical History:   Procedure Laterality Date    CARDIAC CATHETERIZATION  13  MDL    angioplasty, stent to LAD. EF 20-25%    CARPAL TUNNEL RELEASE Bilateral 2016    CARPAL TUNNEL RELEASE performed by Feliberto Resendez MD at [address]      ankle fx. repair     SINUS SURGERY       Family History   Problem Relation Age of Onset    Heart Disease Father      Social History     Tobacco Use    Smoking status: Former Smoker     Packs/day: 2.00     Years: 30.00     Pack years: 60.00     Types: Cigarettes     Quit date: 2013     Years since quittin.2    Smokeless tobacco: Never Used   Substance Use Topics    Alcohol use: No      Current Outpatient Medications   Medication Sig Dispense Refill    lisinopril (PRINIVIL;ZESTRIL) 2.5 MG tablet TAKE 1 TABLET BY MOUTH  DAILY 90 tablet 3    esomeprazole Magnesium (NEXIUM) 40 MG PACK Take 40 mg by mouth daily      clopidogrel (PLAVIX) 75 MG tablet Take 1 tablet by mouth once daily 90 tablet 0    citalopram (CELEXA) 40 MG tablet Take 40 mg by mouth daily      pantoprazole (PROTONIX) 40 MG tablet Take 40 mg by mouth daily      levothyroxine (SYNTHROID) 125 MCG tablet Take 125 mcg by mouth Daily      nitroGLYCERIN (NITROSTAT) 0.4 MG SL tablet Place 1 tablet under the tongue every 5 minutes as needed for Chest pain 25 tablet 3    ondansetron (ZOFRAN) 4 MG tablet Take 1 tablet by mouth every 8 hours as needed for Nausea or Vomiting 10 tablet 0    simvastatin (ZOCOR) 40 MG tablet Take 1 tablet by mouth nightly (Patient taking differently: Take 40 mg by mouth nightly Indications: Blood Cholesterol Abnormal ) 90 tablet 3    methotrexate (RHEUMATREX) 2.5 MG chemo tablet Take 2.5 mg by mouth once a week Indications: Psoriasis associated with Arthritis, on saturdays Take 6 tablets weekly      folic acid (FOLVITE) 1 MG tablet Take 1 mg by mouth daily      aspirin 81 MG tablet Take 81 mg by mouth daily.  bisoprolol-hydrochlorothiazide (ZIAC) 5-6.25 MG per tablet Take 1 tablet by mouth daily Indications: High Blood Pressure        No current facility-administered medications for this visit. Allergies: Chantix [varenicline], Metformin and related, and Morphine    Review of Systems  Review of Systems   Constitutional: Positive for fatigue. Negative for activity change, diaphoresis, fever and unexpected weight change. HENT: Negative for facial swelling and nosebleeds. Eyes: Negative for redness and visual disturbance. Respiratory: Negative for cough, chest tightness, shortness of breath and wheezing. Cardiovascular: Negative for chest pain, palpitations and leg swelling. Gastrointestinal: Negative for abdominal pain, nausea and vomiting. Endocrine: Negative for cold intolerance and heat intolerance. Genitourinary: Negative for dysuria and hematuria. Musculoskeletal: Negative for arthralgias and myalgias. Chronic, generalized pain   Skin: Negative for pallor and rash. Neurological: Negative for dizziness, seizures, syncope, weakness and light-headedness. Hematological: Does not bruise/bleed easily. Psychiatric/Behavioral: Negative for agitation. The patient is not nervous/anxious. Objective  Vital Signs - /84   Pulse 68   Ht 5' 6\" (1.676 m)   Wt 212 lb (96.2 kg)   SpO2 94%   BMI 34.22 kg/m²    Wt Readings from Last 3 Encounters:   10/14/21 212 lb (96.2 kg)   11/05/20 207 lb (93.9 kg)   02/06/20 209 lb (94.8 kg)      Physical Exam  Vitals and nursing note reviewed. Constitutional:       General: Diamond Rater is not in acute distress. Appearance: Diamond Rater is well-developed.  Diamond Rater is obese. Kvng Aleman is not diaphoretic. HENT:      Head: Normocephalic and atraumatic. Right Ear: Hearing and external ear normal.      Left Ear: Hearing and external ear normal.      Nose: Nose normal.   Eyes:      General:         Right eye: No discharge. Left eye: No discharge. Pupils: Pupils are equal, round, and reactive to light. Neck:      Thyroid: No thyromegaly. Vascular: No carotid bruit or JVD. Trachea: No tracheal deviation. Cardiovascular:      Rate and Rhythm: Normal rate and regular rhythm. Heart sounds: Normal heart sounds. No murmur heard. No friction rub. No gallop. Comments: No carotid bruit  Pulmonary:      Effort: Pulmonary effort is normal. No respiratory distress. Breath sounds: Normal breath sounds. No wheezing or rales. Abdominal:      Palpations: Abdomen is soft. Tenderness: There is no abdominal tenderness. Musculoskeletal:         General: No swelling or deformity. Cervical back: Neck supple. No muscular tenderness. Right lower leg: Edema (trace) present. Left lower leg: Edema (trace) present. Skin:     General: Skin is warm and dry. Findings: No rash. Neurological:      General: No focal deficit present. Mental Status: Kvng Aleman is alert and oriented to person, place, and time. Cranial Nerves: No cranial nerve deficit. Psychiatric:         Mood and Affect: Mood normal.         Behavior: Behavior normal.         Judgment: Judgment normal.         Data:  Heart Cath 4/19  Conclusions      Normal LV systolic function. Mild noncritical coronary artery disease   Patent stent proximal LAD      Recommendations      Routine Post Diagnostic Cath orders. Risk factor modification.     Lab Results   Component Value Date    CHOL 145 (L) 04/29/2019    TRIG 218 (H) 04/29/2019    HDL 42 (L) 04/29/2019    LDLCALC 59 04/29/2019    LDLDIRECT 77 04/27/2014     Lab Results   Component Value Date    ALT 13 04/29/2019    AST 10 04/29/2019     Assessment:     Diagnosis Orders   1. Coronary artery disease involving native coronary artery of native heart without angina pectoris     2. Left ventricular systolic dysfunction     3. Essential hypertension     4. Mixed hyperlipidemia     5. Type 2 diabetes mellitus without complication, without long-term current use of insulin ([facility] [address]     6. Psoriatic arthritis ([facility] [address]     7. Fibromyalgia          CAD-stable without symptoms of angina. Continue Plavix, bisoprolol, simvastatin    Left ventricular systolic dysfunction-history of 20% back in 2013. Normalized per most recent cath in 2019. Continue bisoprolol, lisinopril. Patient appears euvolemic    Hypertension-well-controlled on current regimen with lisinopril and bisoprolol HCTZ    Hyperlipidemia-stable on simvastatin per patient report. Recently had lipids done at PCP office. Will request most recent results    Diabetes-newer diagnosis. A1C 7.6 recently per patient report. Metformin caused GI upset and she has been switched to a new diabetic medication, however she does not know the name as she has not yet picked it up from the pharmacy. We discussed A1c goal less than 7% and following a lower carbohydrate diet    Psoriatic arthritis/fibromyalgia-follows with rheumatology with significant generalized pain. Okay to start Lyrica from a cardiac standpoint. She will be also seeing a chiropractor    Stable cardiovascular status. No evidence of overt heart failure,angina or dysrhythmia. Plan    Return in about 9 months (around 7/14/2022) for APRN. Encourage weight loss and regular exercise  Keep diabetes under control to help prevent further heart disease. Keep Hemoglobin A1C less than 7%. OK for Lyrica    Call with any questionsor concerns  Follow up with Cassidy Hernandez MD for non cardiac problems  Report any new problems  Cardiovascular Fitness-Exercise as tolerated.   Strive for 15 minutes of exercise most days of the week. Cardiac / HealthyDiet  Continue current medications as directed  Continue plan of treatment  It is always recommended that you bring your medicationsbottles with you to each visit - this is for your safety!        Megan Contreras, APRN

## 2021-10-14 NOTE — PATIENT INSTRUCTIONS
Return in about 9 months (around 7/14/2022) for APRN. Encourage weight loss and regular exercise    Keep diabetes under control to help prevent further heart disease. Keep Hemoglobin A1C less than 7%.     OK for Lyrica

## 2022-05-11 ENCOUNTER — TELEPHONE (OUTPATIENT)
Dept: CARDIOLOGY CLINIC | Age: 62
End: 2022-05-11

## 2022-05-11 NOTE — TELEPHONE ENCOUNTER
Okay to send letter for cardiac risk stratification and patient may hold Plavix 5 to 7 days prior to procedure and resume thereafter

## 2022-05-11 NOTE — TELEPHONE ENCOUNTER
Date: 6/30/22    Cardiologist: Tim    Procedure: bilateral middle finger A1 pulley release    Surgeon: Anila Walker    Last Office Visit: 10/14/21  Reason for office visit and medical concerns addressed at this office visit: cad, dm, htn, hyperlipidemia,     Testing Performed and Date of Service:  4/28/19 Cath  Normal LV systolic function. Mild noncritical coronary artery disease   Patent stent proximal LAD    RCRI = 0.9   METs 4    Current Medications: lisinopril, plavix, celexa, pantoprazole, levothyroxine, simvastatin, methotrexate, folic acid, aspirin, bisoprolol/hctz    Is the patient currently taking an anticoagulant?  If so, what is the diagnosis the patient has been given to warrant the need for the anticoagulant? plavix    Additional Notes: requesting cardiac clearance and to hold plavix prior to procedure

## 2022-06-27 ENCOUNTER — HOSPITAL ENCOUNTER (OUTPATIENT)
Dept: PREADMISSION TESTING | Age: 62
Discharge: HOME | End: 2022-07-01
Payer: COMMERCIAL

## 2022-06-27 VITALS — BODY MASS INDEX: 31.82 KG/M2 | WEIGHT: 198 LBS | HEIGHT: 66 IN

## 2022-06-27 LAB
ANION GAP SERPL CALCULATED.3IONS-SCNC: 11 MMOL/L (ref 7–19)
BASOPHILS ABSOLUTE: 0.1 K/UL (ref 0–0.2)
BASOPHILS RELATIVE PERCENT: 0.9 % (ref 0–1)
BUN BLDV-MCNC: 18 MG/DL (ref 8–23)
CALCIUM SERPL-MCNC: 9.3 MG/DL (ref 8.8–10.2)
CHLORIDE BLD-SCNC: 103 MMOL/L (ref 98–111)
CO2: 26 MMOL/L (ref 22–29)
CREAT SERPL-MCNC: 0.7 MG/DL (ref 0.5–0.9)
EKG P AXIS: 45 DEGREES
EKG P-R INTERVAL: 204 MS
EKG Q-T INTERVAL: 482 MS
EKG QRS DURATION: 80 MS
EKG QTC CALCULATION (BAZETT): 474 MS
EKG T AXIS: 71 DEGREES
EOSINOPHILS ABSOLUTE: 0.3 K/UL (ref 0–0.6)
EOSINOPHILS RELATIVE PERCENT: 3.4 % (ref 0–5)
GFR AFRICAN AMERICAN: >59
GFR NON-AFRICAN AMERICAN: >60
GLUCOSE BLD-MCNC: 119 MG/DL (ref 74–109)
HCT VFR BLD CALC: 42.2 % (ref 37–47)
HEMOGLOBIN: 12.9 G/DL (ref 12–16)
IMMATURE GRANULOCYTES #: 0 K/UL
LYMPHOCYTES ABSOLUTE: 1.9 K/UL (ref 1.1–4.5)
LYMPHOCYTES RELATIVE PERCENT: 26.1 % (ref 20–40)
MCH RBC QN AUTO: 30.6 PG (ref 27–31)
MCHC RBC AUTO-ENTMCNC: 30.6 G/DL (ref 33–37)
MCV RBC AUTO: 100.2 FL (ref 81–99)
MONOCYTES ABSOLUTE: 0.5 K/UL (ref 0–0.9)
MONOCYTES RELATIVE PERCENT: 7 % (ref 0–10)
NEUTROPHILS ABSOLUTE: 4.7 K/UL (ref 1.5–7.5)
NEUTROPHILS RELATIVE PERCENT: 62.5 % (ref 50–65)
PDW BLD-RTO: 14.4 % (ref 11.5–14.5)
PLATELET # BLD: 278 K/UL (ref 130–400)
PMV BLD AUTO: 11.5 FL (ref 9.4–12.3)
POTASSIUM SERPL-SCNC: 4.1 MMOL/L (ref 3.5–5)
RBC # BLD: 4.21 M/UL (ref 4.2–5.4)
SODIUM BLD-SCNC: 140 MMOL/L (ref 136–145)
WBC # BLD: 7.4 K/UL (ref 4.8–10.8)

## 2022-06-27 PROCEDURE — 93005 ELECTROCARDIOGRAM TRACING: CPT | Performed by: ORTHOPAEDIC SURGERY

## 2022-06-27 PROCEDURE — 80048 BASIC METABOLIC PNL TOTAL CA: CPT

## 2022-06-27 PROCEDURE — 85025 COMPLETE CBC W/AUTO DIFF WBC: CPT

## 2022-06-27 PROCEDURE — 93010 ELECTROCARDIOGRAM REPORT: CPT | Performed by: INTERNAL MEDICINE

## 2022-06-27 RX ORDER — SIMVASTATIN 40 MG
40 TABLET ORAL NIGHTLY
COMMUNITY

## 2022-06-27 RX ORDER — CLOPIDOGREL BISULFATE 75 MG/1
75 TABLET ORAL NIGHTLY
COMMUNITY

## 2022-06-27 RX ORDER — PREGABALIN 50 MG/1
50 CAPSULE ORAL 2 TIMES DAILY
COMMUNITY

## 2022-06-27 RX ORDER — LISINOPRIL 2.5 MG/1
2.5 TABLET ORAL DAILY
COMMUNITY

## 2022-06-27 RX ORDER — GLIMEPIRIDE 2 MG/1
2 TABLET ORAL
COMMUNITY
End: 2022-11-01 | Stop reason: ALTCHOICE

## 2022-06-30 ENCOUNTER — HOSPITAL ENCOUNTER (OUTPATIENT)
Age: 62
Setting detail: OUTPATIENT SURGERY
Discharge: HOME | End: 2022-06-30
Attending: ORTHOPAEDIC SURGERY | Admitting: ORTHOPAEDIC SURGERY
Payer: COMMERCIAL

## 2022-06-30 ENCOUNTER — ANESTHESIA EVENT (OUTPATIENT)
Dept: OPERATING ROOM | Age: 62
End: 2022-06-30
Payer: COMMERCIAL

## 2022-06-30 ENCOUNTER — ANESTHESIA (OUTPATIENT)
Dept: OPERATING ROOM | Age: 62
End: 2022-06-30
Payer: COMMERCIAL

## 2022-06-30 VITALS
DIASTOLIC BLOOD PRESSURE: 66 MMHG | RESPIRATION RATE: 14 BRPM | HEIGHT: 66 IN | SYSTOLIC BLOOD PRESSURE: 138 MMHG | TEMPERATURE: 98.2 F | BODY MASS INDEX: 31.82 KG/M2 | OXYGEN SATURATION: 94 % | HEART RATE: 63 BPM | WEIGHT: 198 LBS

## 2022-06-30 DIAGNOSIS — M65.332 ACQUIRED TRIGGER FINGER OF BOTH MIDDLE FINGERS: Primary | ICD-10-CM

## 2022-06-30 DIAGNOSIS — M65.331 ACQUIRED TRIGGER FINGER OF BOTH MIDDLE FINGERS: Primary | ICD-10-CM

## 2022-06-30 LAB
GLUCOSE BLD-MCNC: 109 MG/DL (ref 70–99)
PERFORMED ON: ABNORMAL

## 2022-06-30 PROCEDURE — 82947 ASSAY GLUCOSE BLOOD QUANT: CPT

## 2022-06-30 PROCEDURE — 3600000003 HC SURGERY LEVEL 3 BASE: Performed by: ORTHOPAEDIC SURGERY

## 2022-06-30 PROCEDURE — 2580000003 HC RX 258: Performed by: ORTHOPAEDIC SURGERY

## 2022-06-30 PROCEDURE — 3700000000 HC ANESTHESIA ATTENDED CARE: Performed by: ORTHOPAEDIC SURGERY

## 2022-06-30 PROCEDURE — 2500000003 HC RX 250 WO HCPCS: Performed by: ANESTHESIOLOGY

## 2022-06-30 PROCEDURE — 6360000002 HC RX W HCPCS: Performed by: ORTHOPAEDIC SURGERY

## 2022-06-30 PROCEDURE — 6360000002 HC RX W HCPCS

## 2022-06-30 PROCEDURE — 2580000003 HC RX 258: Performed by: ANESTHESIOLOGY

## 2022-06-30 PROCEDURE — 7100000000 HC PACU RECOVERY - FIRST 15 MIN: Performed by: ORTHOPAEDIC SURGERY

## 2022-06-30 PROCEDURE — 6370000000 HC RX 637 (ALT 250 FOR IP): Performed by: ORTHOPAEDIC SURGERY

## 2022-06-30 PROCEDURE — 7100000010 HC PHASE II RECOVERY - FIRST 15 MIN: Performed by: ORTHOPAEDIC SURGERY

## 2022-06-30 PROCEDURE — 3700000001 HC ADD 15 MINUTES (ANESTHESIA): Performed by: ORTHOPAEDIC SURGERY

## 2022-06-30 PROCEDURE — 2709999900 HC NON-CHARGEABLE SUPPLY: Performed by: ORTHOPAEDIC SURGERY

## 2022-06-30 PROCEDURE — 6360000002 HC RX W HCPCS: Performed by: ANESTHESIOLOGY

## 2022-06-30 PROCEDURE — 7100000001 HC PACU RECOVERY - ADDTL 15 MIN: Performed by: ORTHOPAEDIC SURGERY

## 2022-06-30 PROCEDURE — 3600000013 HC SURGERY LEVEL 3 ADDTL 15MIN: Performed by: ORTHOPAEDIC SURGERY

## 2022-06-30 PROCEDURE — 6370000000 HC RX 637 (ALT 250 FOR IP): Performed by: ANESTHESIOLOGY

## 2022-06-30 PROCEDURE — 2500000003 HC RX 250 WO HCPCS

## 2022-06-30 RX ORDER — DEXMEDETOMIDINE HYDROCHLORIDE 100 UG/ML
INJECTION, SOLUTION INTRAVENOUS PRN
Status: DISCONTINUED | OUTPATIENT
Start: 2022-06-30 | End: 2022-06-30 | Stop reason: SDUPTHER

## 2022-06-30 RX ORDER — FENTANYL CITRATE 50 UG/ML
INJECTION, SOLUTION INTRAMUSCULAR; INTRAVENOUS PRN
Status: DISCONTINUED | OUTPATIENT
Start: 2022-06-30 | End: 2022-06-30 | Stop reason: SDUPTHER

## 2022-06-30 RX ORDER — APREPITANT 40 MG/1
40 CAPSULE ORAL ONCE
Status: COMPLETED | OUTPATIENT
Start: 2022-06-30 | End: 2022-06-30

## 2022-06-30 RX ORDER — ONDANSETRON 2 MG/ML
4 INJECTION INTRAMUSCULAR; INTRAVENOUS
Status: DISCONTINUED | OUTPATIENT
Start: 2022-06-30 | End: 2022-06-30 | Stop reason: HOSPADM

## 2022-06-30 RX ORDER — SODIUM CHLORIDE, SODIUM LACTATE, POTASSIUM CHLORIDE, CALCIUM CHLORIDE 600; 310; 30; 20 MG/100ML; MG/100ML; MG/100ML; MG/100ML
INJECTION, SOLUTION INTRAVENOUS CONTINUOUS
Status: DISCONTINUED | OUTPATIENT
Start: 2022-06-30 | End: 2022-06-30 | Stop reason: HOSPADM

## 2022-06-30 RX ORDER — SCOLOPAMINE TRANSDERMAL SYSTEM 1 MG/1
1 PATCH, EXTENDED RELEASE TRANSDERMAL
Status: DISCONTINUED | OUTPATIENT
Start: 2022-06-30 | End: 2022-06-30 | Stop reason: HOSPADM

## 2022-06-30 RX ORDER — HYDROMORPHONE HYDROCHLORIDE 1 MG/ML
0.25 INJECTION, SOLUTION INTRAMUSCULAR; INTRAVENOUS; SUBCUTANEOUS EVERY 5 MIN PRN
Status: DISCONTINUED | OUTPATIENT
Start: 2022-06-30 | End: 2022-06-30 | Stop reason: HOSPADM

## 2022-06-30 RX ORDER — HYDROMORPHONE HYDROCHLORIDE 1 MG/ML
0.5 INJECTION, SOLUTION INTRAMUSCULAR; INTRAVENOUS; SUBCUTANEOUS EVERY 5 MIN PRN
Status: DISCONTINUED | OUTPATIENT
Start: 2022-06-30 | End: 2022-06-30 | Stop reason: HOSPADM

## 2022-06-30 RX ORDER — BISMUTH TRIBROMOPH/PETROLATUM 5"X9"
BANDAGE TOPICAL PRN
Status: DISCONTINUED | OUTPATIENT
Start: 2022-06-30 | End: 2022-06-30 | Stop reason: HOSPADM

## 2022-06-30 RX ORDER — ROPIVACAINE HYDROCHLORIDE 2 MG/ML
INJECTION, SOLUTION EPIDURAL; INFILTRATION; PERINEURAL PRN
Status: DISCONTINUED | OUTPATIENT
Start: 2022-06-30 | End: 2022-06-30 | Stop reason: HOSPADM

## 2022-06-30 RX ORDER — PROPOFOL 10 MG/ML
INJECTION, EMULSION INTRAVENOUS PRN
Status: DISCONTINUED | OUTPATIENT
Start: 2022-06-30 | End: 2022-06-30 | Stop reason: SDUPTHER

## 2022-06-30 RX ORDER — HYDROCODONE BITARTRATE AND ACETAMINOPHEN 7.5; 325 MG/1; MG/1
1 TABLET ORAL EVERY 4 HOURS PRN
Qty: 20 TABLET | Refills: 0 | Status: SHIPPED | OUTPATIENT
Start: 2022-06-30 | End: 2022-07-03

## 2022-06-30 RX ORDER — ONDANSETRON 2 MG/ML
INJECTION INTRAMUSCULAR; INTRAVENOUS PRN
Status: DISCONTINUED | OUTPATIENT
Start: 2022-06-30 | End: 2022-06-30 | Stop reason: SDUPTHER

## 2022-06-30 RX ADMIN — SODIUM CHLORIDE, PRESERVATIVE FREE 20 MG: 5 INJECTION INTRAVENOUS at 06:41

## 2022-06-30 RX ADMIN — APREPITANT 40 MG: 40 CAPSULE ORAL at 06:41

## 2022-06-30 RX ADMIN — PROPOFOL 50 MG: 10 INJECTION, EMULSION INTRAVENOUS at 07:10

## 2022-06-30 RX ADMIN — PROPOFOL 120 MCG/KG/MIN: 10 INJECTION, EMULSION INTRAVENOUS at 07:14

## 2022-06-30 RX ADMIN — FENTANYL CITRATE 25 MCG: 50 INJECTION, SOLUTION INTRAMUSCULAR; INTRAVENOUS at 07:23

## 2022-06-30 RX ADMIN — FENTANYL CITRATE 25 MCG: 50 INJECTION, SOLUTION INTRAMUSCULAR; INTRAVENOUS at 07:20

## 2022-06-30 RX ADMIN — ONDANSETRON 4 MG: 2 INJECTION INTRAMUSCULAR; INTRAVENOUS at 07:28

## 2022-06-30 RX ADMIN — DEXMEDETOMIDINE HYDROCHLORIDE 20 MCG: 100 INJECTION, SOLUTION, CONCENTRATE INTRAVENOUS at 07:05

## 2022-06-30 RX ADMIN — FENTANYL CITRATE 50 MCG: 50 INJECTION, SOLUTION INTRAMUSCULAR; INTRAVENOUS at 07:09

## 2022-06-30 RX ADMIN — PROPOFOL 100 MG: 10 INJECTION, EMULSION INTRAVENOUS at 07:09

## 2022-06-30 RX ADMIN — SODIUM CHLORIDE, SODIUM LACTATE, POTASSIUM CHLORIDE, AND CALCIUM CHLORIDE: 600; 310; 30; 20 INJECTION, SOLUTION INTRAVENOUS at 06:11

## 2022-06-30 RX ADMIN — CEFAZOLIN 2000 MG: 2 INJECTION, POWDER, FOR SOLUTION INTRAMUSCULAR; INTRAVENOUS at 07:20

## 2022-06-30 RX ADMIN — PROPOFOL 50 MG: 10 INJECTION, EMULSION INTRAVENOUS at 07:23

## 2022-06-30 ASSESSMENT — LIFESTYLE VARIABLES: SMOKING_STATUS: 0

## 2022-06-30 NOTE — ANESTHESIA PRE PROCEDURE
Department of Anesthesiology  Preprocedure Note       Name:  Elia Banegas   Age:  64 y.o.  :  1960                                          MRN:  [id number]         Date:  2022      Surgeon: Kiki Bird):  Anjel Marquez MD    Procedure: Procedure(s):  BILATERAL MIDDLE TRIGGER FINGER A1 PULLEY RELEASE    Medications prior to admission:   Prior to Admission medications    Medication Sig Start Date End Date Taking? Authorizing Provider   glimepiride (AMARYL) 2 MG tablet Take 2 mg by mouth every morning (before breakfast) Indications: Diabetes    Historical Provider, MD   lisinopril (PRINIVIL;ZESTRIL) 2.5 MG tablet Take 2.5 mg by mouth daily Indications: High Blood Pressure Disorder    Historical Provider, MD   clopidogrel (PLAVIX) 75 MG tablet Take 75 mg by mouth nightly Indications: CORONARY ARTERY DISEASE    Historical Provider, MD   simvastatin (ZOCOR) 40 MG tablet Take 40 mg by mouth nightly Indications: Changes in Cholesterol    Historical Provider, MD   pregabalin (LYRICA) 50 MG capsule Take 50 mg by mouth 2 times daily. Historical Provider, MD   citalopram (CELEXA) 40 MG tablet Take 40 mg by mouth daily    Historical Provider, MD   pantoprazole (PROTONIX) 40 MG tablet Take 40 mg by mouth nightly     Historical Provider, MD   levothyroxine (SYNTHROID) 125 MCG tablet Take 125 mcg by mouth Daily    Historical Provider, MD   nitroGLYCERIN (NITROSTAT) 0.4 MG SL tablet Place 1 tablet under the tongue every 5 minutes as needed for Chest pain 18   SO Max   methotrexate (RHEUMATREX) 2.5 MG chemo tablet Take 20 mg by mouth once a week Indications: Psoriasis associated with Arthritis, on , 8 tabs of 2.5mg 12/31/15   Historical Provider, MD   folic acid (FOLVITE) 1 MG tablet Take 1 mg by mouth nightly  12/31/15   Historical Provider, MD   aspirin 81 MG tablet Take 81 mg by mouth daily.     Historical Provider, MD   bisoprolol-hydrochlorothiazide (Hope Rivas) 5-6.25 MG per Social History     Tobacco Use    Smoking status: Former Smoker     Packs/day: 2.00     Years: 30.00     Pack years: 60.00     Types: Cigarettes     Quit date: 2013     Years since quittin.9    Smokeless tobacco: Never Used   Substance Use Topics    Alcohol use: No                                Counseling given: Not Answered      Vital Signs (Current):   Vitals:    22 0558   BP: (!) 145/81   Pulse: 79   Resp: 18   Temp: 97.7 °F (36.5 °C)   TempSrc: Temporal   SpO2: 99%   Weight: 198 lb (89.8 kg)   Height: 5' 6\" (1.676 m)                                              BP Readings from Last 3 Encounters:   22 (!) 145/81   10/14/21 112/84   20 118/74       NPO Status: Time of last liquid consumption:                         Time of last solid consumption:                         Date of last liquid consumption: 22                        Date of last solid food consumption: 22    BMI:   Wt Readings from Last 3 Encounters:   22 198 lb (89.8 kg)   22 198 lb (89.8 kg)   10/14/21 212 lb (96.2 kg)     Body mass index is 31.96 kg/m².     CBC:   Lab Results   Component Value Date/Time    WBC 7.4 2022 09:56 AM    RBC 4.21 2022 09:56 AM    HGB 12.9 2022 09:56 AM    HCT 42.2 2022 09:56 AM    .2 2022 09:56 AM    RDW 14.4 2022 09:56 AM     2022 09:56 AM       CMP:   Lab Results   Component Value Date/Time     2022 09:56 AM    K 4.1 2022 09:56 AM    K 3.8 2019 01:25 AM     2022 09:56 AM    CO2 26 2022 09:56 AM    BUN 18 2022 09:56 AM    CREATININE 0.7 2022 09:56 AM    GFRAA >59 2022 09:56 AM    LABGLOM >60 2022 09:56 AM    GLUCOSE 119 2022 09:56 AM    PROT 6.3 2019 01:25 AM    CALCIUM 9.3 2022 09:56 AM    BILITOT <0.2 2019 01:25 AM    ALKPHOS 64 2019 01:25 AM    AST 10 2019 01:25 AM    ALT 13 2019 01:25 AM POC Tests:   Recent Labs     06/30/22  0611   POCGLU 109*       Coags:   Lab Results   Component Value Date/Time    PROTIME 13.0 10/14/2016 07:00 AM    INR 0.98 10/14/2016 07:00 AM       HCG (If Applicable): No results found for: PREGTESTUR, PREGSERUM, HCG, HCGQUANT     ABGs: No results found for: PHART, PO2ART, FZI4VBJ, HCZ3WDK, BEART, V1NPQPPR     Type & Screen (If Applicable):  No results found for: LABABO, LABRH    Drug/Infectious Status (If Applicable):  No results found for: HIV, HEPCAB    COVID-19 Screening (If Applicable): No results found for: COVID19        Anesthesia Evaluation  Patient summary reviewed no history of anesthetic complications:   Airway: Mallampati: III  TM distance: >3 FB   Neck ROM: full  Mouth opening: > = 3 FB   Dental: normal exam         Pulmonary:normal exam  breath sounds clear to auscultation  (+) sleep apnea: on noncompliant,      (-) asthma, recent URI and not a current smoker          Patient did not smoke on day of surgery. Cardiovascular:  Exercise tolerance: good (>4 METS),   (+) hypertension:, past MI (2013):, CAD:, CABG/stent (Stent 2013):,     (-) pacemaker and  angina      Rhythm: regular  Rate: normal           Beta Blocker:  Dose within 24 Hrs         Neuro/Psych:   (+) neuromuscular disease (Fibromyalgia):,    (-) seizures, TIA and CVA           GI/Hepatic/Renal:        (-) GERD, liver disease and no renal disease       Endo/Other:    (+) DiabetesType II DM, , blood dyscrasia (Off clopidogrel for one week)::., .    (-) hypothyroidism, hyperthyroidism               Abdominal:             Vascular:     - DVT. Other Findings:           Anesthesia Plan      general and TIVA     ASA 3     (TIVA for PONV prophylaxis  Preop famotidine, scopolamine, emend)  Induction: intravenous. MIPS: Postoperative opioids intended and Prophylactic antiemetics administered. Anesthetic plan and risks discussed with patient and spouse.     Use of blood products discussed with patient and spouse whom consented to blood products.                      Claire Linder MD   6/30/2022

## 2022-06-30 NOTE — PROGRESS NOTES
CLINICAL PHARMACY NOTE: MEDS TO BEDS    Total # of Prescriptions Filled: 1   The following medications were delivered to the patient:  · Hydrocodone-acetaminophen 7.5-325mg    Additional Documentation:  The patients  paid with a credit card on the [employer] machine, the medications were handed to the patients .

## 2022-06-30 NOTE — H&P
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 Gradual in onset. No injury. Dr. Kasandra Olguin for psoriatic arthritis previous injections that did not help with this. Past Medical History:        Diagnosis Date    CAD (coronary artery disease) 2013    EF 20-25% , EF 50-55% ; [facility]    Carpal tunnel syndrome     COVID-19 2022    slight headache and fatigue    [facility] [address]     Fibromyalgia     HTN (hypertension)     Hyperlipidemia     Mixed hyperlipidemia 2017    Post-menopausal     Psoriatic arthritis ([facility] [address]     Sleep apnea     cpap       Past Surgical History:        Procedure Laterality Date    CARDIAC CATHETERIZATION  13  MDL    angioplasty, stent to LAD. EF 20-25%    CARPAL TUNNEL RELEASE Bilateral 2016    CARPAL TUNNEL RELEASE performed by Bakari Sow MD at [facility]      ankle fx. repair 1994    SINUS SURGERY         Current Medications:   Prior to Admission medications    Medication Sig Start Date End Date Taking? Authorizing Provider   glimepiride (AMARYL) 2 MG tablet Take 2 mg by mouth every morning (before breakfast) Indications: Diabetes    Historical Provider, MD   lisinopril (PRINIVIL;ZESTRIL) 2.5 MG tablet Take 2.5 mg by mouth daily Indications: High Blood Pressure Disorder    Historical Provider, MD   clopidogrel (PLAVIX) 75 MG tablet Take 75 mg by mouth nightly Indications: CORONARY ARTERY DISEASE    Historical Provider, MD   simvastatin (ZOCOR) 40 MG tablet Take 40 mg by mouth nightly Indications: Changes in Cholesterol    Historical Provider, MD   pregabalin (LYRICA) 50 MG capsule Take 50 mg by mouth 2 times daily.     Historical Provider, MD   citalopram (CELEXA) 40 MG tablet Take 40 mg by mouth daily    Historical Provider, MD   pantoprazole (PROTONIX) 40 MG tablet Take 40 mg by mouth nightly     Historical Provider, MD   levothyroxine (SYNTHROID) 125 MCG tablet Take 125 mcg by mouth Daily    Historical Provider, MD   nitroGLYCERIN (NITROSTAT) 0.4 MG SL tablet Place 1 tablet under the tongue every 5 minutes as needed for Chest pain 18   Shay Cordell Opitz, APRN   methotrexate (RHEUMATREX) 2.5 MG chemo tablet Take 20 mg by mouth once a week Indications: Psoriasis associated with Arthritis, on , 8 tabs of 2.5mg 12/31/15   Historical Provider, MD   folic acid (FOLVITE) 1 MG tablet Take 1 mg by mouth nightly  12/31/15   Historical Provider, MD   aspirin 81 MG tablet Take 81 mg by mouth daily.     Historical Provider, MD   bisoprolol-hydrochlorothiazide (David Colon) 5-6.25 MG per tablet Take 1 tablet by mouth nightly Indications: High Blood Pressure Disorder     Historical Provider, MD       Allergies:  Morphine, Chantix [varenicline], and Metformin and related    Social History:   Social History     Socioeconomic History    Marital status:      Spouse name: Not on file    Number of children: Not on file    Years of education: Not on file    Highest education level: Not on file   Occupational History    Not on file   Tobacco Use    Smoking status: Former Smoker     Packs/day: 2.00     Years: 30.00     Pack years: 60.00     Types: Cigarettes     Quit date: 2013     Years since quittin.9    Smokeless tobacco: Never Used   Vaping Use    Vaping Use: Never used   Substance and Sexual Activity    Alcohol use: No    Drug use: No    Sexual activity: Not on file   Other Topics Concern    Not on file   Social History Narrative     34 years second marriage    She has no children    Education high school    Protestant by k[employer] not presently attending a Jainism    She enjoys cooking and gardening    Remains moderately active but physically sedentary    Smoked one half pack per day quit 6 years ago    Denies alcohol consumption or substance usage     Social Determinants of Health     Financial Resource Strain:     Difficulty of Paying Living Expenses: Not on file   Food Insecurity:     Worried About Running Out of Food in the Last Year: Not on file    Sunny of Food in the Last Year: Not on file   Transportation Needs:     Lack of Transportation (Medical): Not on file    Lack of Transportation (Non-Medical): Not on file   Physical Activity:     Days of Exercise per Week: Not on file    Minutes of Exercise per Session: Not on file   Stress:     Feeling of Stress : Not on file   Social Connections:     Frequency of Communication with Friends and Family: Not on file    Frequency of Social Gatherings with Friends and Family: Not on file    Attends Adventism Services: Not on file    Active Member of [address] [place] [address] [employer] or Organizations: Not on file    Attends Club or Organization Meetings: Not on file    Marital Status: Not on file   Intimate Partner Violence:     Fear of Current or Ex-Partner: Not on file    Emotionally Abused: Not on file    Physically Abused: Not on file    Sexually Abused: Not on file   Housing Stability:     Unable to Pay for Housing in the Last Year: Not on file    Number of [place] in the Last Year: Not on file    Unstable Housing in the Last Year: Not on file       Family History:   Family History   Problem Relation Age of Onset    Heart Disease Father        REVIEW OF SYSTEMS:  14 point review of systems has been reviewed from the patient's emergency room visit, reviewed with the patient on today's date with no new changes.     PHYSICAL EXAM:      Physical Examination:  Vitals:   Vitals:    06/30/22 0558   BP: (!) 145/81   Pulse: 79   Resp: 18   Temp: 97.7 °F (36.5 °C)   TempSrc: Temporal   SpO2: 99%   Weight: 198 lb (89.8 kg)   Height: 5' 6\" (1.676 m)     General:  Appears stated age, no distress. Orientation:  Alert and oriented to time, place, and person. Mood and Affect:  Cooperative and pleasant. Gait:  Resting comfortably in bed. Cardiovascular:  Symmetric 1-2 plus pulses in upper and lower extremities. Lymph:  No cervical or inguinal lymphadenopathy noted. Sensation:  Grossly intact to light touch. DTR:  Normal, no pathologic reflexes. Coordination/balance:  Normal    Musculoskeletal: Both hands show no atrophy or deformity. She has good distal pulses. Good capillary refill. Tender over the A1 pulleys of her middle fingers. Active triggering of her middle fingers in both hands.   DATA:    CBC with Differential:    Lab Results   Component Value Date/Time    WBC 7.4 06/27/2022 09:56 AM    RBC 4.21 06/27/2022 09:56 AM    HGB 12.9 06/27/2022 09:56 AM    HCT 42.2 06/27/2022 09:56 AM     06/27/2022 09:56 AM    .2 06/27/2022 09:56 AM    MCH 30.6 06/27/2022 09:56 AM    MCHC 30.6 06/27/2022 09:56 AM    RDW 14.4 06/27/2022 09:56 AM    LYMPHOPCT 26.1 06/27/2022 09:56 AM    MONOPCT 7.0 06/27/2022 09:56 AM    BASOPCT 0.9 06/27/2022 09:56 AM    MONOSABS 0.50 06/27/2022 09:56 AM    LYMPHSABS 1.9 06/27/2022 09:56 AM    EOSABS 0.30 06/27/2022 09:56 AM    BASOSABS 0.10 06/27/2022 09:56 AM     CMP:    Lab Results   Component Value Date/Time     06/27/2022 09:56 AM    K 4.1 06/27/2022 09:56 AM    K 3.8 04/29/2019 01:25 AM     06/27/2022 09:56 AM    CO2 26 06/27/2022 09:56 AM    BUN 18 06/27/2022 09:56 AM    CREATININE 0.7 06/27/2022 09:56 AM    GFRAA >59 06/27/2022 09:56 AM    LABGLOM >60 06/27/2022 09:56 AM    GLUCOSE 119 06/27/2022 09:56 AM    PROT 6.3 04/29/2019 01:25 AM    CALCIUM 9.3 06/27/2022 09:56 AM    BILITOT <0.2 04/29/2019 01:25 AM    ALKPHOS 64 04/29/2019 01:25 AM    AST 10 04/29/2019 01:25 AM    ALT 13 04/29/2019 01:25 AM     BMP:    Lab Results   Component Value Date/Time     06/27/2022 09:56 AM    K 4.1 06/27/2022 09:56 AM    K 3.8 04/29/2019 01:25 AM

## 2022-06-30 NOTE — OP NOTE
OPERATIVE NOTE  Trigger finger release    Patient:  Dominic Ragland    Date:  6/30/2022    Medical Record Number:  [id number]    Primary Pre-Operative Diagnosis:  Bilateral  long finger triggering    Primary Post-Operative Diagnosis:  Same    Procedure:  Bilateral long finger A1 Pulley Release      Assistant: none      Anesthesia:  LMA    Estimated Blood Loss:  Minimal    Complications:  None    Findings:  As above    Indications: The patient has failed conservative treatment and presents now for the above procedure. . She understands the benefits and risks and consents freely. Risks are bleeding, infection, anesthesia reaction, nerve injury, stiffness. Procedure:  Patient was brought into the operationg room and the upper extremity was prepped with chlorhexidene/alcohol and sterilelydraped. The patient was given LMA anesthesia and the area of the incision was anesthetized with 8 ml of 0.2% naropin using a 23 gauge needle. An esmarch was tightly wrapped distal to proximal and padded with a towel in the mid left forearm to act as a tourniquet. A half inch transverse incision was made over the A1 pulley just through skin. Longitudinal scissor dissection was taken down to the A1 jair. Mary retractors were used to protect soft tissues and the digital nerves. The A1 was incised with a 15 blade. The flexor tendons were intact. The esmarch was cut and the wound rinsed with saline. There was minimal bleeding. The incision(s) was closed with 4-0 nylon, horizontal mattress sutures and a sterile dressing applied. The same procedure was performed on the opposite hand. The patient was awakened and transferred to recovery in stable condition. Plan: Activity as tolerated. Follow-up in 2 weeks.       Electronically signed by Zuly Salinas MD on 6/30/2022 at 7:36 AM

## 2022-06-30 NOTE — ANESTHESIA POSTPROCEDURE EVALUATION
Department of Anesthesiology  Postprocedure Note    Patient: Leroy Montes De Oca  MRN: [id number]  YOB: 1960  Date of evaluation: 6/30/2022      Procedure Summary     Date: 06/30/22 Room / Location: [address]    Anesthesia Start: 0705 Anesthesia Stop: 0745    Procedure: BILATERAL MIDDLE TRIGGER FINGER A1 PULLEY RELEASE (Bilateral ) Diagnosis:       Trigger middle finger of left hand      Trigger middle finger of right hand      (M65.332, M65.331)    Surgeons: Bruno Jackson MD Responsible Provider: SO Mcgee CRNA    Anesthesia Type: general, TIVA ASA Status: 3          Anesthesia Type: No value filed.     Barry Phase I: Barry Score: 10    Barry Phase II:        Anesthesia Post Evaluation    Patient location during evaluation: PACU  Patient participation: waiting for patient participation  Level of consciousness: responsive to painful stimuli  Pain score: 0  Airway patency: patent  Nausea & Vomiting: no nausea and no vomiting  Complications: no  Cardiovascular status: blood pressure returned to baseline  Respiratory status: acceptable and nasal cannula (LMA)  Hydration status: euvolemic

## 2022-08-03 ENCOUNTER — TRANSCRIBE ORDERS (OUTPATIENT)
Dept: ADMINISTRATIVE | Facility: HOSPITAL | Age: 62
End: 2022-08-03

## 2022-08-03 DIAGNOSIS — M51.36 DDD (DEGENERATIVE DISC DISEASE), LUMBAR: Primary | ICD-10-CM

## 2022-08-23 ENCOUNTER — APPOINTMENT (OUTPATIENT)
Dept: MRI IMAGING | Facility: HOSPITAL | Age: 62
End: 2022-08-23

## 2022-11-01 ENCOUNTER — OFFICE VISIT (OUTPATIENT)
Dept: CARDIOLOGY CLINIC | Age: 62
End: 2022-11-01
Payer: COMMERCIAL

## 2022-11-01 VITALS
SYSTOLIC BLOOD PRESSURE: 124 MMHG | OXYGEN SATURATION: 97 % | BODY MASS INDEX: 31.5 KG/M2 | DIASTOLIC BLOOD PRESSURE: 74 MMHG | HEART RATE: 76 BPM | HEIGHT: 66 IN | WEIGHT: 196 LBS

## 2022-11-01 DIAGNOSIS — E11.9 TYPE 2 DIABETES MELLITUS WITHOUT COMPLICATION, WITHOUT LONG-TERM CURRENT USE OF INSULIN (HCC): ICD-10-CM

## 2022-11-01 DIAGNOSIS — M79.7 FIBROMYALGIA: ICD-10-CM

## 2022-11-01 DIAGNOSIS — I10 ESSENTIAL HYPERTENSION: ICD-10-CM

## 2022-11-01 DIAGNOSIS — I51.9 LEFT VENTRICULAR SYSTOLIC DYSFUNCTION: ICD-10-CM

## 2022-11-01 DIAGNOSIS — E78.2 MIXED HYPERLIPIDEMIA: ICD-10-CM

## 2022-11-01 DIAGNOSIS — L40.50 PSORIATIC ARTHRITIS (HCC): ICD-10-CM

## 2022-11-01 DIAGNOSIS — I25.10 CORONARY ARTERY DISEASE INVOLVING NATIVE CORONARY ARTERY OF NATIVE HEART WITHOUT ANGINA PECTORIS: Primary | ICD-10-CM

## 2022-11-01 PROCEDURE — 99214 OFFICE O/P EST MOD 30 MIN: CPT | Performed by: CLINICAL NURSE SPECIALIST

## 2022-11-01 PROCEDURE — 3078F DIAST BP <80 MM HG: CPT | Performed by: CLINICAL NURSE SPECIALIST

## 2022-11-01 PROCEDURE — 1036F TOBACCO NON-USER: CPT | Performed by: CLINICAL NURSE SPECIALIST

## 2022-11-01 PROCEDURE — G8417 CALC BMI ABV UP PARAM F/U: HCPCS | Performed by: CLINICAL NURSE SPECIALIST

## 2022-11-01 PROCEDURE — 3074F SYST BP LT 130 MM HG: CPT | Performed by: CLINICAL NURSE SPECIALIST

## 2022-11-01 PROCEDURE — G8484 FLU IMMUNIZE NO ADMIN: HCPCS | Performed by: CLINICAL NURSE SPECIALIST

## 2022-11-01 PROCEDURE — 3017F COLORECTAL CA SCREEN DOC REV: CPT | Performed by: CLINICAL NURSE SPECIALIST

## 2022-11-01 PROCEDURE — 2022F DILAT RTA XM EVC RTNOPTHY: CPT | Performed by: CLINICAL NURSE SPECIALIST

## 2022-11-01 PROCEDURE — G8427 DOCREV CUR MEDS BY ELIG CLIN: HCPCS | Performed by: CLINICAL NURSE SPECIALIST

## 2022-11-01 PROCEDURE — 3046F HEMOGLOBIN A1C LEVEL >9.0%: CPT | Performed by: CLINICAL NURSE SPECIALIST

## 2022-11-01 RX ORDER — ORAL SEMAGLUTIDE 7 MG/1
7 TABLET ORAL DAILY
COMMUNITY

## 2022-11-01 RX ORDER — ALLOPURINOL 100 MG/1
100 TABLET ORAL 2 TIMES DAILY
COMMUNITY

## 2022-11-01 ASSESSMENT — ENCOUNTER SYMPTOMS
ABDOMINAL PAIN: 0
CHEST TIGHTNESS: 0
EYE REDNESS: 0
COUGH: 0
VOMITING: 0
WHEEZING: 0
NAUSEA: 0
SHORTNESS OF BREATH: 1
FACIAL SWELLING: 0

## 2022-11-01 NOTE — PATIENT INSTRUCTIONS
Return in about 6 months (around 5/1/2023) for Dr. Madeleine Lugo. Encourage weight loss and regular exercise    Keep diabetes under control to help prevent further heart disease. Keep Hemoglobin A1C less than 7%. LDL cholesterol ( bad cholesterol) goal is less than 70 with a history of heart disease.   Consider Zetia if not at goal

## 2022-11-01 NOTE — PROGRESS NOTES
[facility], [address] [address] [place] [facility], [address] [address]  Phone: [phone]  Fax: [phone]    OFFICE VISIT:  2022    Allan Musa - : 1960    Reason For Visit:  Pankaj Woods is a 58 y.o. female who is here for Follow-up (Patient states she has been SOA and fatigue. ) and Coronary Artery Disease       Diagnosis Orders   1. Coronary artery disease involving native coronary artery of native heart without angina pectoris        2. Left ventricular systolic dysfunction        3. Essential hypertension        4. Mixed hyperlipidemia        5. Type 2 diabetes mellitus without complication, without long-term current use of insulin ([address]        6. Psoriatic arthritis ([address]        7. Fibromyalgia              HPI  Patient is here follow-up with a history of CAD (stent LAD , w LVEF 20%)  Last heart cath on 2019 showed a patent stent and normal LV function with minimal disease. She denies chest pain, unusual dyspnea, orthopnea, PND, edema, palpitations, or sudden weight gain. She recently retired from [employer]. She sees a rheumatologist with psoriatic arthritis, scoliosis and also has fibromyalgia. Mobility is limited due to these conditions. She is applying for disability     Shon Acuña MD is PCP.   Allan Musa has the following history as recorded in [facility]:    Patient Active Problem List    Diagnosis Date Noted    H/O heart artery stent 2019    Psoriatic arthritis ([address] 10/14/2021    Fibromyalgia 10/14/2021    Type 2 diabetes mellitus without complication, without long-term current use of insulin ([address] 10/14/2021    Class 1 obesity due to excess calories in adult 2019    Mixed hyperlipidemia 2017    Bilateral carpal tunnel syndrome 2016    CAD (coronary artery disease) 2013    HTN (hypertension)      Past Medical History:   Diagnosis Date    CAD (coronary artery disease) 2013    EF 20-25% , EF 50-55% ; [facility]    Carpal tunnel syndrome     COVID-19 2022    slight headache and fatigue    Diabetes mellitus ([facility]     Fibromyalgia     HTN (hypertension)     Hyperlipidemia     Mixed hyperlipidemia 2017    Post-menopausal     Psoriatic arthritis ([facility]     Sleep apnea     cpap     Past Surgical History:   Procedure Laterality Date    CARDIAC CATHETERIZATION  13  MDL    angioplasty, stent to LAD. EF 20-25%    CARPAL TUNNEL RELEASE Bilateral 2016    CARPAL TUNNEL RELEASE performed by Belkis Wayne MD at [address] [place] [address] Rd Bilateral 2022    BILATERAL MIDDLE TRIGGER FINGER A1 PULLEY RELEASE performed by Namita Kirby MD at [facility] 12      ankle fx. repair     SINUS SURGERY       Family History   Problem Relation Age of Onset    Heart Disease Father      Social History     Tobacco Use    Smoking status: Former     Packs/day: 2.00     Years: 30.00     Pack years: 60.00     Types: Cigarettes     Quit date: 2013     Years since quittin.2    Smokeless tobacco: Never   Substance Use Topics    Alcohol use: No      Current Outpatient Medications   Medication Sig Dispense Refill    empagliflozin (JARDIANCE) 10 MG tablet Take 10 mg by mouth daily      allopurinol (ZYLOPRIM) 100 MG tablet Take 100 mg by mouth 2 times daily      Semaglutide (RYBELSUS) 7 MG TABS Take 7 mg by mouth daily      lisinopril (PRINIVIL;ZESTRIL) 2.5 MG tablet Take 2.5 mg by mouth daily Indications: High Blood Pressure Disorder      clopidogrel (PLAVIX) 75 MG tablet Take 75 mg by mouth nightly Indications: CORONARY ARTERY DISEASE      simvastatin (ZOCOR) 40 MG tablet Take 40 mg by mouth nightly Indications: Changes in Cholesterol      pregabalin (LYRICA) 50 MG capsule Take 50 mg by mouth 2 times daily.       citalopram (CELEXA) 40 MG tablet Take 40 mg by mouth daily      pantoprazole (PROTONIX) 40 MG tablet Take 40 mg by mouth nightly       levothyroxine (SYNTHROID) 125 MCG tablet Take 125 mcg by mouth Daily      nitroGLYCERIN (NITROSTAT) 0.4 MG SL tablet Place 1 tablet under the tongue every 5 minutes as needed for Chest pain 25 tablet 3    methotrexate (RHEUMATREX) 2.5 MG chemo tablet Take 20 mg by mouth once a week Indications: Psoriasis associated with Arthritis, on saturdays Wednesdays, 8 tabs of 7.3LK      folic acid (FOLVITE) 1 MG tablet Take 1 mg by mouth nightly       aspirin 81 MG tablet Take 81 mg by mouth daily. bisoprolol-hydroCHLOROthiazide (ZIAC) 5-6.25 MG per tablet Take 1 tablet by mouth nightly Indications: High Blood Pressure Disorder        No current facility-administered medications for this visit. Allergies: Morphine, Chantix [varenicline], and Metformin and related    Review of Systems  Review of Systems   Constitutional:  Positive for fatigue. Negative for activity change, diaphoresis, fever and unexpected weight change. HENT:  Negative for facial swelling and nosebleeds. Eyes:  Negative for redness and visual disturbance. Respiratory:  Positive for shortness of breath (mild). Negative for cough, chest tightness and wheezing. Cardiovascular:  Negative for chest pain, palpitations and leg swelling. Gastrointestinal:  Negative for abdominal pain, nausea and vomiting. Endocrine: Negative for cold intolerance and heat intolerance. Genitourinary:  Negative for dysuria and hematuria. Musculoskeletal:  Negative for arthralgias and myalgias. Chronic, generalized pain   Skin:  Negative for pallor and rash. Neurological:  Negative for dizziness, seizures, syncope, weakness and light-headedness. Hematological:  Does not bruise/bleed easily. Psychiatric/Behavioral:  Negative for agitation. The patient is not nervous/anxious.       Objective  Vital Signs - /74   Pulse 76   Ht 5' 6\" (1.676 m)   Wt 196 lb (88.9 kg)   SpO2 97%   BMI 31.64 kg/m²    Wt Readings from Last 3 Encounters:   11/01/22 196 lb (88.9 kg)   06/27/22 198 lb (89.8 kg)   06/30/22 198 lb (89.8 kg)      Physical Exam  Vitals and nursing note reviewed. Constitutional:       General: She is not in acute distress. Appearance: She is well-developed. She is obese. She is not diaphoretic. HENT:      Head: Normocephalic and atraumatic. Right Ear: Hearing and external ear normal.      Left Ear: Hearing and external ear normal.      Nose: Nose normal.   Eyes:      General:         Right eye: No discharge. Left eye: No discharge. Pupils: Pupils are equal, round, and reactive to light. Neck:      Thyroid: No thyromegaly. Vascular: No carotid bruit or JVD. Trachea: No tracheal deviation. Cardiovascular:      Rate and Rhythm: Normal rate and regular rhythm. Heart sounds: Normal heart sounds. No murmur heard. No friction rub. No gallop. Comments: No carotid bruit  Pulmonary:      Effort: Pulmonary effort is normal. No respiratory distress. Breath sounds: Normal breath sounds. No wheezing or rales. Abdominal:      Palpations: Abdomen is soft. Tenderness: There is no abdominal tenderness. Musculoskeletal:         General: No swelling or deformity. Cervical back: Neck supple. No muscular tenderness. Right lower leg: Edema (trace) present. Left lower leg: Edema (trace) present. Skin:     General: Skin is warm and dry. Findings: No rash. Neurological:      General: No focal deficit present. Mental Status: She is alert and oriented to person, place, and time. Cranial Nerves: No cranial nerve deficit. Psychiatric:         Mood and Affect: Mood normal.         Behavior: Behavior normal.         Judgment: Judgment normal.       Data:  Heart Cath 4/19  Conclusions      Normal LV systolic function. Mild noncritical coronary artery disease   Patent stent proximal LAD      Recommendations      Routine Post Diagnostic Cath orders. Risk factor modification.     Reviewed EKG dated 6/27/2022 that shows sinus bradycardia with borderline first-degree AV block at rate 55, unchanged compared to EKG dated 11/5/2020    Assessment:     Diagnosis Orders   1. Coronary artery disease involving native coronary artery of native heart without angina pectoris        2. Left ventricular systolic dysfunction        3. Essential hypertension        4. Mixed hyperlipidemia        5. Type 2 diabetes mellitus without complication, without long-term current use of insulin ([facility]        6. Psoriatic arthritis ([facility]        7. Fibromyalgia               CAD-stable without symptoms of angina. Continue aspirin, Plavix, bisoprolol, simvastatin    Left ventricular systolic dysfunction-history of 20% back in 2013. Normalized per most recent cath in 2019. Continue bisoprolol, lisinopril. Patient appears euvolemic    Hypertension-well-controlled on current regimen with lisinopril and bisoprolol HCTZ    Hyperlipidemia-fair control with last LDL on 10/7/2021 under the media tab at 96. We discussed the goal of LDL less than 70. Consider addition of Zetia    Diabetes-stable per patient report with A1c less than 7 on Jardiance and Rybelsus    Psoriatic arthritis/fibromyalgia-follows with rheumatology with significant generalized pain. Stable cardiovascular status. No evidence of overt heart failure,angina or dysrhythmia. Plan    Return in about 6 months (around 5/1/2023) for Dr. Sarah Lundberg. Encourage weight loss and regular exercise    Keep diabetes under control to help prevent further heart disease. Keep Hemoglobin A1C less than 7%. LDL cholesterol ( bad cholesterol) goal is less than 70 with a history of heart disease. Consider Zetia if not at goal    Call with any questionsor concerns  Follow up with Walt Valencia MD for non cardiac problems  Report any new problems  Cardiovascular Fitness-Exercise as tolerated. Strive for 15 minutes of exercise most days of the week.     Cardiac / HealthyDiet  Continue current medications as directed  Continue plan of treatment  It is always recommended that you bring your medicationsbottles with you to each visit - this is for your safety!        Nick Wakefield, APRN

## 2023-03-06 ENCOUNTER — HOSPITAL ENCOUNTER (OUTPATIENT)
Dept: GENERAL RADIOLOGY | Facility: HOSPITAL | Age: 63
Discharge: HOME | End: 2023-03-06
Admitting: PAIN MEDICINE
Payer: COMMERCIAL

## 2023-03-06 ENCOUNTER — TRANSCRIBE ORDERS (OUTPATIENT)
Dept: ADMINISTRATIVE | Facility: HOSPITAL | Age: 63
End: 2023-03-06
Payer: COMMERCIAL

## 2023-03-06 DIAGNOSIS — M54.16 LUMBAR RADICULOPATHY: Primary | ICD-10-CM

## 2023-03-06 DIAGNOSIS — M54.16 LUMBAR RADICULOPATHY: ICD-10-CM

## 2023-03-06 PROCEDURE — 72110 X-RAY EXAM L-2 SPINE 4/>VWS: CPT

## 2023-05-01 ENCOUNTER — OFFICE VISIT (OUTPATIENT)
Dept: CARDIOLOGY CLINIC | Age: 63
End: 2023-05-01
Payer: COMMERCIAL

## 2023-05-01 VITALS
HEART RATE: 78 BPM | HEIGHT: 66 IN | WEIGHT: 213 LBS | SYSTOLIC BLOOD PRESSURE: 122 MMHG | DIASTOLIC BLOOD PRESSURE: 74 MMHG | BODY MASS INDEX: 34.23 KG/M2

## 2023-05-01 DIAGNOSIS — E78.2 MIXED HYPERLIPIDEMIA: ICD-10-CM

## 2023-05-01 DIAGNOSIS — I25.10 CORONARY ARTERY DISEASE INVOLVING NATIVE CORONARY ARTERY OF NATIVE HEART WITHOUT ANGINA PECTORIS: Primary | ICD-10-CM

## 2023-05-01 DIAGNOSIS — I10 ESSENTIAL HYPERTENSION: ICD-10-CM

## 2023-05-01 DIAGNOSIS — I51.9 LEFT VENTRICULAR SYSTOLIC DYSFUNCTION: ICD-10-CM

## 2023-05-01 PROCEDURE — 99213 OFFICE O/P EST LOW 20 MIN: CPT | Performed by: INTERNAL MEDICINE

## 2023-05-01 PROCEDURE — 3074F SYST BP LT 130 MM HG: CPT | Performed by: INTERNAL MEDICINE

## 2023-05-01 PROCEDURE — 3017F COLORECTAL CA SCREEN DOC REV: CPT | Performed by: INTERNAL MEDICINE

## 2023-05-01 PROCEDURE — 3078F DIAST BP <80 MM HG: CPT | Performed by: INTERNAL MEDICINE

## 2023-05-01 PROCEDURE — G8417 CALC BMI ABV UP PARAM F/U: HCPCS | Performed by: INTERNAL MEDICINE

## 2023-05-01 PROCEDURE — 1036F TOBACCO NON-USER: CPT | Performed by: INTERNAL MEDICINE

## 2023-05-01 PROCEDURE — G8427 DOCREV CUR MEDS BY ELIG CLIN: HCPCS | Performed by: INTERNAL MEDICINE

## 2023-05-01 RX ORDER — NITROGLYCERIN 0.4 MG/1
0.4 TABLET SUBLINGUAL EVERY 5 MIN PRN
Qty: 25 TABLET | Refills: 3 | Status: SHIPPED | OUTPATIENT
Start: 2023-05-01

## 2023-05-01 RX ORDER — TIRZEPATIDE 2.5 MG/.5ML
2.5 INJECTION, SOLUTION SUBCUTANEOUS WEEKLY
COMMUNITY

## 2023-05-01 ASSESSMENT — ENCOUNTER SYMPTOMS
SHORTNESS OF BREATH: 0
GASTROINTESTINAL NEGATIVE: 1
VOMITING: 0
NAUSEA: 0
DIARRHEA: 0
EYES NEGATIVE: 1
RESPIRATORY NEGATIVE: 1

## 2023-05-01 NOTE — PROGRESS NOTES
[facility]                            Date:  5/1/2023  Patient: Racquel Beckwith  Age:  58 y. o., 1960      Reason for evaluation:         SUBJECTIVE:    Returns today follow-up assessment overall doing well followed for coronary artery disease denies chest pain dyspnea claudication or palpitations. Blood pressure today 122/74 heart 78. No other complaints or issues reported. She has lipids done in her primary care office every 3 months I asked her to bring a copy that on her next visit. Review of Systems   Constitutional: Negative. Negative for chills, fever and unexpected weight change. HENT: Negative. Eyes: Negative. Respiratory: Negative. Negative for shortness of breath. Cardiovascular: Negative. Negative for chest pain. Gastrointestinal: Negative. Negative for diarrhea, nausea and vomiting. Endocrine: Negative. Genitourinary: Negative. Musculoskeletal: Negative. Skin: Negative. Neurological: Negative. All other systems reviewed and are negative. OBJECTIVE:     /74   Pulse 78   Ht 5' 6\" (1.676 m)   Wt 213 lb (96.6 kg)   BMI 34.38 kg/m²     Labs:   CBC: No results for input(s): WBC, HGB, HCT, PLT in the last 72 hours. BMP:No results for input(s): NA, K, CO2, BUN, CREATININE, LABGLOM, GLUCOSE in the last 72 hours. BNP: No results for input(s): BNP in the last 72 hours. PT/INR: No results for input(s): PROTIME, INR in the last 72 hours. APTT:No results for input(s): APTT in the last 72 hours. CARDIAC ENZYMES:No results for input(s): CKTOTAL, CKMB, CKMBINDEX, TROPONINI in the last 72 hours. FASTING LIPID PANEL:  Lab Results   Component Value Date/Time    HDL 42 04/29/2019 01:25 AM    LDLDIRECT 77 04/27/2014 04:23 AM    LDLCALC 59 04/29/2019 01:25 AM    TRIG 218 04/29/2019 01:25 AM     LIVER PROFILE:No results for input(s): AST, ALT, LABALBU in the last 72 hours.         Past Medical History:   Diagnosis Date    CAD (coronary

## 2023-05-11 ENCOUNTER — TRANSCRIBE ORDERS (OUTPATIENT)
Dept: ADMINISTRATIVE | Facility: HOSPITAL | Age: 63
End: 2023-05-11
Payer: COMMERCIAL

## 2023-05-11 ENCOUNTER — HOSPITAL ENCOUNTER (OUTPATIENT)
Dept: GENERAL RADIOLOGY | Facility: HOSPITAL | Age: 63
Discharge: HOME | End: 2023-05-11
Admitting: NURSE PRACTITIONER
Payer: COMMERCIAL

## 2023-05-11 DIAGNOSIS — M43.06 SPONDYLOLYSIS, LUMBAR REGION: ICD-10-CM

## 2023-05-11 DIAGNOSIS — M47.816 LUMBAR SPONDYLOSIS: Primary | ICD-10-CM

## 2023-05-11 DIAGNOSIS — M47.816 LUMBAR SPONDYLOSIS: ICD-10-CM

## 2023-05-11 DIAGNOSIS — M25.561 RIGHT KNEE PAIN, UNSPECIFIED CHRONICITY: ICD-10-CM

## 2023-05-11 PROCEDURE — 73562 X-RAY EXAM OF KNEE 3: CPT

## 2023-07-27 ENCOUNTER — OFFICE VISIT (OUTPATIENT)
Dept: CARDIOLOGY CLINIC | Age: 63
End: 2023-07-27
Payer: COMMERCIAL

## 2023-07-27 VITALS
SYSTOLIC BLOOD PRESSURE: 128 MMHG | WEIGHT: 208 LBS | HEART RATE: 82 BPM | BODY MASS INDEX: 33.43 KG/M2 | HEIGHT: 66 IN | DIASTOLIC BLOOD PRESSURE: 84 MMHG

## 2023-07-27 DIAGNOSIS — I10 ESSENTIAL HYPERTENSION: ICD-10-CM

## 2023-07-27 DIAGNOSIS — Z95.5 H/O HEART ARTERY STENT: ICD-10-CM

## 2023-07-27 DIAGNOSIS — E78.2 MIXED HYPERLIPIDEMIA: ICD-10-CM

## 2023-07-27 DIAGNOSIS — I51.9 LEFT VENTRICULAR SYSTOLIC DYSFUNCTION: ICD-10-CM

## 2023-07-27 DIAGNOSIS — I25.10 CORONARY ARTERY DISEASE INVOLVING NATIVE CORONARY ARTERY OF NATIVE HEART WITHOUT ANGINA PECTORIS: Primary | ICD-10-CM

## 2023-07-27 PROCEDURE — 3079F DIAST BP 80-89 MM HG: CPT | Performed by: INTERNAL MEDICINE

## 2023-07-27 PROCEDURE — 3017F COLORECTAL CA SCREEN DOC REV: CPT | Performed by: INTERNAL MEDICINE

## 2023-07-27 PROCEDURE — 93000 ELECTROCARDIOGRAM COMPLETE: CPT | Performed by: INTERNAL MEDICINE

## 2023-07-27 PROCEDURE — G8417 CALC BMI ABV UP PARAM F/U: HCPCS | Performed by: INTERNAL MEDICINE

## 2023-07-27 PROCEDURE — 3074F SYST BP LT 130 MM HG: CPT | Performed by: INTERNAL MEDICINE

## 2023-07-27 PROCEDURE — 1036F TOBACCO NON-USER: CPT | Performed by: INTERNAL MEDICINE

## 2023-07-27 PROCEDURE — G8427 DOCREV CUR MEDS BY ELIG CLIN: HCPCS | Performed by: INTERNAL MEDICINE

## 2023-07-27 PROCEDURE — 99214 OFFICE O/P EST MOD 30 MIN: CPT | Performed by: INTERNAL MEDICINE

## 2023-07-27 ASSESSMENT — ENCOUNTER SYMPTOMS
SHORTNESS OF BREATH: 0
RESPIRATORY NEGATIVE: 1
GASTROINTESTINAL NEGATIVE: 1
NAUSEA: 0
EYES NEGATIVE: 1
DIARRHEA: 0
VOMITING: 0

## 2023-08-17 ENCOUNTER — TELEPHONE (OUTPATIENT)
Dept: CARDIOLOGY CLINIC | Age: 63
End: 2023-08-17

## 2023-08-17 NOTE — TELEPHONE ENCOUNTER
Pt states her ortho doctor wants her to take voltaren and zanaflex and she wanted to make sure it was ok with her cardio MD before she started taking these meds. Both will be long term.

## 2023-08-21 NOTE — TELEPHONE ENCOUNTER
Per Dr. Malika Palmer he doesn't recommended taking Voltaren or Zanaflex with Hx of CAD. PT voiced her understanding.

## 2024-01-09 ENCOUNTER — TELEPHONE (OUTPATIENT)
Dept: CARDIOLOGY CLINIC | Age: 64
End: 2024-01-09

## 2024-01-09 NOTE — TELEPHONE ENCOUNTER
Called in an attempt to reschedule 1/15/2024 appt that patient cancelled, patient stated she is about to have surgery and doesn't wish to reschedule at this moment.

## 2024-01-19 ENCOUNTER — APPOINTMENT (OUTPATIENT)
Dept: GENERAL RADIOLOGY | Age: 64
End: 2024-01-19
Attending: PODIATRIST
Payer: COMMERCIAL

## 2024-01-19 ENCOUNTER — ANESTHESIA EVENT (OUTPATIENT)
Dept: OPERATING ROOM | Age: 64
End: 2024-01-19
Payer: COMMERCIAL

## 2024-01-19 ENCOUNTER — ANESTHESIA (OUTPATIENT)
Dept: OPERATING ROOM | Age: 64
End: 2024-01-19
Payer: COMMERCIAL

## 2024-01-19 ENCOUNTER — HOSPITAL ENCOUNTER (OUTPATIENT)
Age: 64
Setting detail: OUTPATIENT SURGERY
Discharge: HOME | End: 2024-01-19
Attending: PODIATRIST | Admitting: PODIATRIST
Payer: COMMERCIAL

## 2024-01-19 VITALS
DIASTOLIC BLOOD PRESSURE: 73 MMHG | WEIGHT: 190 LBS | SYSTOLIC BLOOD PRESSURE: 149 MMHG | TEMPERATURE: 97.7 F | RESPIRATION RATE: 20 BRPM | BODY MASS INDEX: 30.53 KG/M2 | OXYGEN SATURATION: 96 % | HEIGHT: 66 IN | HEART RATE: 76 BPM

## 2024-01-19 DIAGNOSIS — M19.172 POST-TRAUMATIC ARTHRITIS OF LEFT ANKLE: Primary | ICD-10-CM

## 2024-01-19 LAB
GLUCOSE BLD-MCNC: 138 MG/DL (ref 70–99)
GLUCOSE BLD-MCNC: 152 MG/DL (ref 70–99)
PERFORMED ON: ABNORMAL
PERFORMED ON: ABNORMAL

## 2024-01-19 PROCEDURE — 3600000004 HC SURGERY LEVEL 4 BASE: Performed by: PODIATRIST

## 2024-01-19 PROCEDURE — 2720000010 HC SURG SUPPLY STERILE: Performed by: PODIATRIST

## 2024-01-19 PROCEDURE — 7100000011 HC PHASE II RECOVERY - ADDTL 15 MIN: Performed by: PODIATRIST

## 2024-01-19 PROCEDURE — C1776 JOINT DEVICE (IMPLANTABLE): HCPCS | Performed by: PODIATRIST

## 2024-01-19 PROCEDURE — 6370000000 HC RX 637 (ALT 250 FOR IP): Performed by: ANESTHESIOLOGY

## 2024-01-19 PROCEDURE — 7100000010 HC PHASE II RECOVERY - FIRST 15 MIN: Performed by: PODIATRIST

## 2024-01-19 PROCEDURE — 6370000000 HC RX 637 (ALT 250 FOR IP): Performed by: PODIATRIST

## 2024-01-19 PROCEDURE — 64445 NJX AA&/STRD SCIATIC NRV IMG: CPT

## 2024-01-19 PROCEDURE — 7100000000 HC PACU RECOVERY - FIRST 15 MIN: Performed by: PODIATRIST

## 2024-01-19 PROCEDURE — 6360000002 HC RX W HCPCS: Performed by: ANESTHESIOLOGY

## 2024-01-19 PROCEDURE — 2500000003 HC RX 250 WO HCPCS: Performed by: ANESTHESIOLOGY

## 2024-01-19 PROCEDURE — 2580000003 HC RX 258: Performed by: PODIATRIST

## 2024-01-19 PROCEDURE — C1713 ANCHOR/SCREW BN/BN,TIS/BN: HCPCS | Performed by: PODIATRIST

## 2024-01-19 PROCEDURE — 73610 X-RAY EXAM OF ANKLE: CPT

## 2024-01-19 PROCEDURE — 6360000002 HC RX W HCPCS: Performed by: PODIATRIST

## 2024-01-19 PROCEDURE — 6360000002 HC RX W HCPCS

## 2024-01-19 PROCEDURE — 3700000001 HC ADD 15 MINUTES (ANESTHESIA): Performed by: PODIATRIST

## 2024-01-19 PROCEDURE — 3700000000 HC ANESTHESIA ATTENDED CARE: Performed by: PODIATRIST

## 2024-01-19 PROCEDURE — 3600000014 HC SURGERY LEVEL 4 ADDTL 15MIN: Performed by: PODIATRIST

## 2024-01-19 PROCEDURE — 82962 GLUCOSE BLOOD TEST: CPT

## 2024-01-19 PROCEDURE — 2709999900 HC NON-CHARGEABLE SUPPLY: Performed by: PODIATRIST

## 2024-01-19 PROCEDURE — 7100000001 HC PACU RECOVERY - ADDTL 15 MIN: Performed by: PODIATRIST

## 2024-01-19 PROCEDURE — 2500000003 HC RX 250 WO HCPCS

## 2024-01-19 DEVICE — IMPLANTABLE DEVICE: Type: IMPLANTABLE DEVICE | Status: FUNCTIONAL

## 2024-01-19 DEVICE — IMPLANTABLE DEVICE
Type: IMPLANTABLE DEVICE | Status: FUNCTIONAL
Brand: INFINITY™ BIOFOAM™ 3D

## 2024-01-19 DEVICE — IMPLANTABLE DEVICE
Type: IMPLANTABLE DEVICE | Status: FUNCTIONAL
Brand: INFINITY™ EVERLAST™

## 2024-01-19 DEVICE — TIBIAL TRAY
Type: IMPLANTABLE DEVICE | Status: FUNCTIONAL
Brand: INFINITY ADAPTIS

## 2024-01-19 DEVICE — PLATE BNE L54MM 7 H BILAT S STL LOK COMPR LO PROF FOR 2MM: Type: IMPLANTABLE DEVICE | Status: FUNCTIONAL

## 2024-01-19 DEVICE — STEINMANN PIN
Type: IMPLANTABLE DEVICE | Status: FUNCTIONAL
Brand: INBONE

## 2024-01-19 DEVICE — SCREW BNE ST 2X8 MM LCK W/ STARDRV RECESS SS NS LCP: Type: IMPLANTABLE DEVICE | Status: FUNCTIONAL

## 2024-01-19 DEVICE — K-WIRE
Type: IMPLANTABLE DEVICE | Status: FUNCTIONAL
Brand: INBONE

## 2024-01-19 RX ORDER — PROPOFOL 10 MG/ML
INJECTION, EMULSION INTRAVENOUS PRN
Status: DISCONTINUED | OUTPATIENT
Start: 2024-01-19 | End: 2024-01-19 | Stop reason: SDUPTHER

## 2024-01-19 RX ORDER — ROCURONIUM BROMIDE 10 MG/ML
INJECTION, SOLUTION INTRAVENOUS PRN
Status: DISCONTINUED | OUTPATIENT
Start: 2024-01-19 | End: 2024-01-19 | Stop reason: SDUPTHER

## 2024-01-19 RX ORDER — EPHEDRINE SULFATE 50 MG/ML
INJECTION, SOLUTION INTRAVENOUS PRN
Status: DISCONTINUED | OUTPATIENT
Start: 2024-01-19 | End: 2024-01-19 | Stop reason: SDUPTHER

## 2024-01-19 RX ORDER — MIDAZOLAM HYDROCHLORIDE 2 MG/2ML
2 INJECTION, SOLUTION INTRAMUSCULAR; INTRAVENOUS
Status: COMPLETED | OUTPATIENT
Start: 2024-01-19 | End: 2024-01-19

## 2024-01-19 RX ORDER — APREPITANT 40 MG/1
40 CAPSULE ORAL ONCE
Status: COMPLETED | OUTPATIENT
Start: 2024-01-19 | End: 2024-01-19

## 2024-01-19 RX ORDER — FENTANYL CITRATE 50 UG/ML
INJECTION, SOLUTION INTRAMUSCULAR; INTRAVENOUS PRN
Status: DISCONTINUED | OUTPATIENT
Start: 2024-01-19 | End: 2024-01-19 | Stop reason: SDUPTHER

## 2024-01-19 RX ORDER — SODIUM CHLORIDE 0.9 % (FLUSH) 0.9 %
5-40 SYRINGE (ML) INJECTION EVERY 12 HOURS SCHEDULED
Status: DISCONTINUED | OUTPATIENT
Start: 2024-01-19 | End: 2024-01-19 | Stop reason: HOSPADM

## 2024-01-19 RX ORDER — ETOMIDATE 2 MG/ML
INJECTION INTRAVENOUS PRN
Status: DISCONTINUED | OUTPATIENT
Start: 2024-01-19 | End: 2024-01-19 | Stop reason: SDUPTHER

## 2024-01-19 RX ORDER — ROPIVACAINE HYDROCHLORIDE 5 MG/ML
30 INJECTION, SOLUTION EPIDURAL; INFILTRATION; PERINEURAL ONCE
Status: DISCONTINUED | OUTPATIENT
Start: 2024-01-19 | End: 2024-01-19 | Stop reason: HOSPADM

## 2024-01-19 RX ORDER — FAMOTIDINE 20 MG/1
20 TABLET, FILM COATED ORAL ONCE
Status: COMPLETED | OUTPATIENT
Start: 2024-01-19 | End: 2024-01-19

## 2024-01-19 RX ORDER — LIDOCAINE HYDROCHLORIDE 10 MG/ML
INJECTION, SOLUTION INFILTRATION; PERINEURAL
Status: COMPLETED | OUTPATIENT
Start: 2024-01-19 | End: 2024-01-19

## 2024-01-19 RX ORDER — SODIUM CHLORIDE 9 MG/ML
INJECTION, SOLUTION INTRAVENOUS PRN
Status: DISCONTINUED | OUTPATIENT
Start: 2024-01-19 | End: 2024-01-19 | Stop reason: HOSPADM

## 2024-01-19 RX ORDER — LIDOCAINE HYDROCHLORIDE 10 MG/ML
1 INJECTION, SOLUTION EPIDURAL; INFILTRATION; INTRACAUDAL; PERINEURAL
Status: DISCONTINUED | OUTPATIENT
Start: 2024-01-19 | End: 2024-01-19 | Stop reason: HOSPADM

## 2024-01-19 RX ORDER — LABETALOL 20 MG/4 ML (5 MG/ML) INTRAVENOUS SYRINGE
PRN
Status: DISCONTINUED | OUTPATIENT
Start: 2024-01-19 | End: 2024-01-19 | Stop reason: SDUPTHER

## 2024-01-19 RX ORDER — OXYCODONE AND ACETAMINOPHEN 10; 325 MG/1; MG/1
1 TABLET ORAL EVERY 4 HOURS
Qty: 30 TABLET | Refills: 0 | Status: SHIPPED | OUTPATIENT
Start: 2024-01-19 | End: 2024-01-24

## 2024-01-19 RX ORDER — ESMOLOL HYDROCHLORIDE 10 MG/ML
INJECTION INTRAVENOUS PRN
Status: DISCONTINUED | OUTPATIENT
Start: 2024-01-19 | End: 2024-01-19 | Stop reason: SDUPTHER

## 2024-01-19 RX ORDER — ONDANSETRON 4 MG/1
4 TABLET, ORALLY DISINTEGRATING ORAL EVERY 4 HOURS PRN
Qty: 21 TABLET | Refills: 0 | Status: SHIPPED | OUTPATIENT
Start: 2024-01-19

## 2024-01-19 RX ORDER — BISMUTH TRIBROMOPH/PETROLATUM 5"X9"
BANDAGE TOPICAL PRN
Status: DISCONTINUED | OUTPATIENT
Start: 2024-01-19 | End: 2024-01-19 | Stop reason: HOSPADM

## 2024-01-19 RX ORDER — SODIUM CHLORIDE, SODIUM LACTATE, POTASSIUM CHLORIDE, CALCIUM CHLORIDE 600; 310; 30; 20 MG/100ML; MG/100ML; MG/100ML; MG/100ML
INJECTION, SOLUTION INTRAVENOUS CONTINUOUS
Status: DISCONTINUED | OUTPATIENT
Start: 2024-01-19 | End: 2024-01-19 | Stop reason: HOSPADM

## 2024-01-19 RX ORDER — ROPIVACAINE HYDROCHLORIDE 5 MG/ML
INJECTION, SOLUTION EPIDURAL; INFILTRATION; PERINEURAL
Status: COMPLETED | OUTPATIENT
Start: 2024-01-19 | End: 2024-01-19

## 2024-01-19 RX ORDER — SODIUM CHLORIDE 0.9 % (FLUSH) 0.9 %
5-40 SYRINGE (ML) INJECTION PRN
Status: DISCONTINUED | OUTPATIENT
Start: 2024-01-19 | End: 2024-01-19 | Stop reason: HOSPADM

## 2024-01-19 RX ORDER — MIDAZOLAM HYDROCHLORIDE 1 MG/ML
INJECTION INTRAMUSCULAR; INTRAVENOUS PRN
Status: DISCONTINUED | OUTPATIENT
Start: 2024-01-19 | End: 2024-01-19 | Stop reason: SDUPTHER

## 2024-01-19 RX ORDER — SCOLOPAMINE TRANSDERMAL SYSTEM 1 MG/1
1 PATCH, EXTENDED RELEASE TRANSDERMAL
Status: DISCONTINUED | OUTPATIENT
Start: 2024-01-19 | End: 2024-01-19 | Stop reason: HOSPADM

## 2024-01-19 RX ADMIN — EPHEDRINE SULFATE 5 MG: 50 INJECTION INTRAMUSCULAR; INTRAVENOUS; SUBCUTANEOUS at 08:30

## 2024-01-19 RX ADMIN — CEFAZOLIN 2000 MG: 2 INJECTION, POWDER, FOR SOLUTION INTRAMUSCULAR; INTRAVENOUS at 09:29

## 2024-01-19 RX ADMIN — LABETALOL 20 MG/4 ML (5 MG/ML) INTRAVENOUS SYRINGE 5 MG: at 07:46

## 2024-01-19 RX ADMIN — MIDAZOLAM 2 MG: 1 INJECTION INTRAMUSCULAR; INTRAVENOUS at 07:01

## 2024-01-19 RX ADMIN — PROPOFOL 50 MG: 10 INJECTION, EMULSION INTRAVENOUS at 07:17

## 2024-01-19 RX ADMIN — ROCURONIUM BROMIDE 10 MG: 10 INJECTION, SOLUTION INTRAVENOUS at 08:32

## 2024-01-19 RX ADMIN — FAMOTIDINE 20 MG: 20 TABLET, FILM COATED ORAL at 06:56

## 2024-01-19 RX ADMIN — PROPOFOL 50 MG: 10 INJECTION, EMULSION INTRAVENOUS at 07:52

## 2024-01-19 RX ADMIN — FENTANYL CITRATE 100 MCG: 0.05 INJECTION, SOLUTION INTRAMUSCULAR; INTRAVENOUS at 07:46

## 2024-01-19 RX ADMIN — EPHEDRINE SULFATE 5 MG: 50 INJECTION INTRAMUSCULAR; INTRAVENOUS; SUBCUTANEOUS at 08:28

## 2024-01-19 RX ADMIN — FENTANYL CITRATE 50 MCG: 0.05 INJECTION, SOLUTION INTRAMUSCULAR; INTRAVENOUS at 09:35

## 2024-01-19 RX ADMIN — LABETALOL 20 MG/4 ML (5 MG/ML) INTRAVENOUS SYRINGE 5 MG: at 07:52

## 2024-01-19 RX ADMIN — APREPITANT 40 MG: 40 CAPSULE ORAL at 06:56

## 2024-01-19 RX ADMIN — LIDOCAINE HYDROCHLORIDE 1 ML: 10 INJECTION, SOLUTION INFILTRATION; PERINEURAL at 07:06

## 2024-01-19 RX ADMIN — EPHEDRINE SULFATE 5 MG: 50 INJECTION INTRAMUSCULAR; INTRAVENOUS; SUBCUTANEOUS at 07:41

## 2024-01-19 RX ADMIN — MIDAZOLAM 2 MG: 1 INJECTION INTRAMUSCULAR; INTRAVENOUS at 07:11

## 2024-01-19 RX ADMIN — SUGAMMADEX 150 MG: 100 INJECTION, SOLUTION INTRAVENOUS at 09:30

## 2024-01-19 RX ADMIN — ETOMIDATE 20 MG: 2 INJECTION, SOLUTION INTRAVENOUS at 07:17

## 2024-01-19 RX ADMIN — ROPIVACAINE HYDROCHLORIDE 20 ML: 5 INJECTION, SOLUTION EPIDURAL; INFILTRATION; PERINEURAL at 07:06

## 2024-01-19 RX ADMIN — CEFAZOLIN 2000 MG: 2 INJECTION, POWDER, FOR SOLUTION INTRAMUSCULAR; INTRAVENOUS at 07:13

## 2024-01-19 RX ADMIN — ESMOLOL HYDROCHLORIDE 15 MG: 10 INJECTION, SOLUTION INTRAVENOUS at 07:16

## 2024-01-19 RX ADMIN — ROCURONIUM BROMIDE 50 MG: 10 INJECTION, SOLUTION INTRAVENOUS at 07:18

## 2024-01-19 ASSESSMENT — PAIN - FUNCTIONAL ASSESSMENT
PAIN_FUNCTIONAL_ASSESSMENT: NONE - DENIES PAIN
PAIN_FUNCTIONAL_ASSESSMENT: NONE - DENIES PAIN
PAIN_FUNCTIONAL_ASSESSMENT: 0-10
PAIN_FUNCTIONAL_ASSESSMENT: NONE - DENIES PAIN

## 2024-01-19 ASSESSMENT — ENCOUNTER SYMPTOMS
EYES NEGATIVE: 1
GASTROINTESTINAL NEGATIVE: 1
RESPIRATORY NEGATIVE: 1
ALLERGIC/IMMUNOLOGIC NEGATIVE: 1

## 2024-01-19 ASSESSMENT — LIFESTYLE VARIABLES: SMOKING_STATUS: 0

## 2024-01-19 ASSESSMENT — PAIN DESCRIPTION - DESCRIPTORS: DESCRIPTORS: ACHING

## 2024-01-19 NOTE — ANESTHESIA PROCEDURE NOTES
Peripheral Block    Patient location during procedure: holding area  Reason for block: post-op pain management  Start time: 1/19/2024 7:06 AM  Staffing  Performed: anesthesiologist   Anesthesiologist: Kaylin Buckley MD  Performed by: Kaylin Buckley MD  Authorized by: Kaylin Buckley MD    Preanesthetic Checklist  Completed: patient identified, IV checked, site marked, risks and benefits discussed, surgical/procedural consents, equipment checked, pre-op evaluation, timeout performed, anesthesia consent given, oxygen available and monitors applied/VS acknowledged  Peripheral Block   Patient position: supine  Prep: ChloraPrep  Provider prep: mask and sterile gloves  Patient monitoring: cardiac monitor, continuous pulse ox, frequent blood pressure checks and IV access  Block type: Sciatic  Popliteal  Laterality: left  Injection technique: single-shot  Guidance: ultrasound guided    Needle   Needle type: combined needle/nerve stimulator   Needle gauge: 22 G  Needle localization: ultrasound guidance  Needle length: 10 cm  Assessment   Injection assessment: negative aspiration for heme, no paresthesia on injection and local visualized surrounding nerve on ultrasound  Paresthesia pain: none  Slow fractionated injection: yes  Hemodynamics: stable  Real-time US image taken/store: yes  Outcomes: patient tolerated procedure well and uncomplicated    Additional Notes  20 ml 0.5% Ropivacaine injected    Under ultrasound (\"US\") guidance, a 22 gauge needle was inserted and placed in close proximity to the sciatic nerve. Ultrasound was also used to visualize the spread of the anesthetic in close proximity to the nerve being blocked. The nerve appeared anatomically normal, and there were no abnormal pathological findings. A permanent image was recorded.   Medications Administered  lidocaine injection 1% - Subcuticular   1 mL - 1/19/2024 7:06:00 AM  ropivacaine (NAROPIN) injection 0.5% - Perineural   20 mL - 1/19/2024 7:06:00

## 2024-01-19 NOTE — ANESTHESIA POSTPROCEDURE EVALUATION
Department of Anesthesiology  Postprocedure Note    Patient: Evon Glover  MRN: [id number]  YOB: 1960  Date of evaluation: 1/19/2024    Procedure Summary       Date: 01/19/24 Room / Location: [address]    Anesthesia Start: 0711 Anesthesia Stop: 0945    Procedures:       TOTAL ANKLE ARTHROPLASTY INFINITY IMPLANT WITH FLAT CUT TALUS      GASTROCNEMIUS RECESSION OF LEFT FOOT (Left: Leg Lower) Diagnosis:       Post-traumatic osteoarthritis of left ankle      Achilles tendon contracture, left      (Post-traumatic osteoarthritis of left ankle [M19.172])      (Achilles tendon contracture, left [M67.02])    Surgeons: Sacha Anderson II, DPM Responsible Provider: Denise Carver APRN - CRNA    Anesthesia Type: general, regional, TIVA ASA Status: 3            Anesthesia Type: No value filed.    Barry Phase I: Barry Score: 10    Barry Phase II:      Anesthesia Post Evaluation    Patient location during evaluation: PACU  Patient participation: complete - patient participated  Level of consciousness: responsive to light touch  Pain score: 0  Airway patency: patent  Nausea & Vomiting: no vomiting  Cardiovascular status: hemodynamically stable  Respiratory status: room air  Hydration status: stable  Pain management: adequate    No notable events documented.

## 2024-01-19 NOTE — PROGRESS NOTES
CLINICAL PHARMACY NOTE: MEDS TO BEDS    Total # of Prescriptions Filled: 2   The following medications were delivered to the patient:  Discharge Medication List as of 1/19/2024 11:07 AM        START taking these medications    Details   oxyCODONE-acetaminophen (PERCOCET)  MG per tablet Take 1 tablet by mouth every 4 hours for 5 days. Max Daily Amount: 6 tablets, Disp-30 tablet, R-0Normal      ondansetron (ZOFRAN-ODT) 4 MG disintegrating tablet Take 1 tablet by mouth every 4 hours as needed for Nausea or Vomiting, Disp-21 tablet, R-0Normal               Additional Documentation:    Patient picked up from pharmacy. Paid with cash.

## 2024-01-19 NOTE — H&P
[facility]   Admission History and Physical      Admission Diagnosis: Post-traumatic osteoarthritis of left ankle [M19.172]  Achilles tendon contracture, left [M67.02]    Admission Date: 1/19/2024    Patient Care Team:  Rishi Ward MD as PCP - General (Family Medicine)  Rishi Ward MD as PCP [facility]  Truman Dumont MD as Consulting Physician (Interventional Cardiology)       SUBJECTIVE:    Chief complaint/History of present illness: This is a 63-year-old female presents today complaining of continued left ankle pain.  She does have significant arthritic changes to the left ankle.  Previous treatments have included bracing, shoe changes, corticosteroid injections without much improvement relief.  Pain level is 8-10 of 10 daily.  Does have a history of an ankle fracture back in 1994.    Review of Systems  Review of Systems   Constitutional: Negative.    HENT: Negative.     Eyes: Negative.    Respiratory: Negative.     Cardiovascular: Negative.    Gastrointestinal: Negative.    Endocrine: Negative.    Genitourinary: Negative.    Musculoskeletal: Negative.    Allergic/Immunologic: Negative.    Neurological: Negative.    Hematological: Negative.    Psychiatric/Behavioral: Negative.          History  Past Medical History:   Diagnosis Date    CAD (coronary artery disease) 09/13/2013    EF 20-25% 2013, EF 50-55% 2016; [facility]    Carpal tunnel syndrome     bilateral    COVID-19 01/2022    slight headache and fatigue    Diabetes mellitus (HCC)     Fibromyalgia     HTN (hypertension)     Hyperlipidemia     Mixed hyperlipidemia 07/19/2017    Osteoarthritis     PONV (postoperative nausea and vomiting)     Post-menopausal     Sleep apnea     cpap - not tolerated, doesn't use   ,   Past Surgical History:   Procedure Laterality Date    CARDIAC CATHETERIZATION  7/28/13  MDL    angioplasty, stent to LAD.EF 20-25%    CARPAL TUNNEL RELEASE Bilateral 12/27/2016

## 2024-01-19 NOTE — ANESTHESIA PRE PROCEDURE
Department of Anesthesiology  Preprocedure Note       Name:  Evon Glover   Age:  63 y.o.  :  1960                                          MRN:  [id number]         Date:  2024      Surgeon: Surgeon(s):  Sacha Anderson II, DPM    Procedure: Procedure(s):  TOTAL ANKLE ARTHROPLASTY INFINITY IMPLANT WITH FLAT CUT TALUS  GASTROCNEMIUS RECESSION OF LEFT FOOT    Medications prior to admission:   Prior to Admission medications    Medication Sig Start Date End Date Taking? Authorizing Provider   Tirzepatide (MOUNJARO) 2.5 MG/0.5ML SOPN SC injection Inject 0.5 mLs into the skin once a week    Deniz Ferguson MD   nitroGLYCERIN (NITROSTAT) 0.4 MG SL tablet Place 1 tablet under the tongue every 5 minutes as needed for Chest pain 23   Truman Dumont MD   allopurinol (ZYLOPRIM) 100 MG tablet Take 2 tablets by mouth daily    Deniz Ferguson MD   lisinopril (PRINIVIL;ZESTRIL) 2.5 MG tablet Take 1 tablet by mouth nightly Indications: High Blood Pressure Disorder    Deniz Ferguson MD   clopidogrel (PLAVIX) 75 MG tablet Take 1 tablet by mouth nightly Indications: Thickening and Hardening of Heart Arteries    Deniz Ferguson MD   simvastatin (ZOCOR) 40 MG tablet Take 1 tablet by mouth nightly Indications: Changes in Cholesterol    Deniz Ferguson MD   citalopram (CELEXA) 40 MG tablet Take 1 tablet by mouth daily    Deniz Ferguson MD   pantoprazole (PROTONIX) 40 MG tablet Take 1 tablet by mouth nightly    Deniz Ferguson MD   levothyroxine (SYNTHROID) 125 MCG tablet Take 1 tablet by mouth Daily    Deniz Ferguson MD   folic acid (FOLVITE) 1 MG tablet Take 1 tablet by mouth nightly 12/31/15   Deniz Ferguson MD   aspirin 81 MG tablet Take 1 tablet by mouth daily    Deniz Ferguson MD   bisoprolol-hydroCHLOROthiazide (ZIAC) 5-6.25 MG per tablet Take 1 tablet by mouth daily Indications: High Blood Pressure Disorder    Deniz Ferguson MD       Current

## 2024-01-19 NOTE — OP NOTE
Operative Note      Patient: Evon Glover  YOB: 1960  MRN: [id number]    Date of Procedure: 1/19/2024    Pre-Op Diagnosis Codes:     * Post-traumatic osteoarthritis of left ankle [M19.172]     * Achilles tendon contracture, left [M67.02]    Post-Op Diagnosis: Same       Procedure  Total ankle arthroplasty with Infinity implant  Gastrocnemius recession left  Achilles tendon lengthening left      Surgeon(s):  Sacha Anderson II, DPRABIA    Assistant:   * No surgical staff found *    Anesthesia: General    Estimated Blood Loss (mL): Minimal    Complications: Other: A fracture of the lateral aspect of the distal tibia was appreciated intraoperatively.  This was fixated with a small Synthes 2.0 plate.    Specimens:   * No specimens in log *    Implants:  Implant Name Type Inv. Item Serial No.  Lot No. LRB No. Used Action   INFINITY ADAPTIS TIB SZ1 INFINITY ADAPTIS - [id number]  INFINITY ADAPTIS TIB SZ1 INFINITY ADAPTIS  [facility] [id number]  1 Implanted   FLATCUT TALAR DOME SZ 1 INFINITY ADAPTIS - [id number]  FLATCUT TALAR DOME SZ 1 INFINITY ADAPTIS  [facility] [id number]  1 Implanted   INFINITY EVERLAST SZ 1/1+ 6MM TOTAL ANKLE - [id number]  INFINITY EVERLAST SZ 1/1+ 6MM TOTAL ANKLE  [id number] [id number]  1 Implanted   PIN FIX DIA2.4MM FOR PROPHECY INBONE TOT ANK SYS STNMN - [id number]  PIN FIX DIA2.4MM FOR PROPHECY INBONE TOT ANK SYS STNMN  [facility]   16 Implanted   K WIRE FIX L228MM DIA1.4MM FOR TOT ANK SYS INBONE II - [id number]  K WIRE FIX L228MM DIA1.4MM FOR TOT ANK SYS INBONE II  [facility]   2 Implanted   SCREW BNE ST 2X6 MM CRTX LCK W/ STARDRV RECESS SS NS LCP - [id number]  SCREW BNE ST 2X6 MM CRTX LCK W/ STARDRV RECESS SS NS LCP  [employer] [facility]-   1 Implanted   SCREW BNE ST 2X8 MM LCK W/ STARDRV RECESS SS NS LCP - [id number]  SCREW BNE ST 2X8 MM LCK W/ STARDRV RECESS SS NS LCP  [id number]

## 2024-01-19 NOTE — DISCHARGE INSTR - ACTIVITY
POST- OPERATIVE PATIENT INSTRUCTIONS  ANKLE SURGERY      Keep your foot elevated above heart level for 48 hours. After the initial 49         hours, you may elevate your foot as necessary to control pain and swelling.    Use an ice pack as much as possible over the dressing to help reduce swelling,         especially during the first 48 hours and then as needed for pain control after         that.    The dressing and incision must be kept dry for the first week. After the doctor’s         office has removed your dressing, you will be allowed brief showers allowing         the water to gently flow over the incision. Do not scrub the surgical site.        When done washing pat dry with a clean towel.    You will need to help you to move about after surgery. You may bear partial           weight with the use of the post operative shoe, cast or splint. Avoid bearing         weight directly on the area of surgery.    Take your prescription and/or over the counter medications as directed by         your physician.    Should you experience any increased pain, swelling or fever, please call the         office immediately.    If you have any questions concerning your surgery or after your surgery please         call our office.    You should already have a follow-up appointment scheduled with the doctor or          his physician’s assistant. If you do not, please call our office immediately to          coordinate one.

## 2024-06-18 ENCOUNTER — OFFICE VISIT (OUTPATIENT)
Dept: CARDIOLOGY CLINIC | Age: 64
End: 2024-06-18
Payer: COMMERCIAL

## 2024-06-18 ENCOUNTER — TELEPHONE (OUTPATIENT)
Dept: CARDIOLOGY CLINIC | Age: 64
End: 2024-06-18

## 2024-06-18 VITALS
DIASTOLIC BLOOD PRESSURE: 70 MMHG | BODY MASS INDEX: 27.8 KG/M2 | SYSTOLIC BLOOD PRESSURE: 112 MMHG | HEIGHT: 66 IN | WEIGHT: 173 LBS | HEART RATE: 69 BPM

## 2024-06-18 DIAGNOSIS — I10 ESSENTIAL HYPERTENSION: ICD-10-CM

## 2024-06-18 DIAGNOSIS — I51.9 LEFT VENTRICULAR SYSTOLIC DYSFUNCTION: ICD-10-CM

## 2024-06-18 DIAGNOSIS — I25.10 CORONARY ARTERY DISEASE INVOLVING NATIVE CORONARY ARTERY OF NATIVE HEART WITHOUT ANGINA PECTORIS: Primary | ICD-10-CM

## 2024-06-18 DIAGNOSIS — E78.2 MIXED HYPERLIPIDEMIA: ICD-10-CM

## 2024-06-18 PROCEDURE — 1036F TOBACCO NON-USER: CPT | Performed by: CLINICAL NURSE SPECIALIST

## 2024-06-18 PROCEDURE — 3078F DIAST BP <80 MM HG: CPT | Performed by: CLINICAL NURSE SPECIALIST

## 2024-06-18 PROCEDURE — G8417 CALC BMI ABV UP PARAM F/U: HCPCS | Performed by: CLINICAL NURSE SPECIALIST

## 2024-06-18 PROCEDURE — 99214 OFFICE O/P EST MOD 30 MIN: CPT | Performed by: CLINICAL NURSE SPECIALIST

## 2024-06-18 PROCEDURE — G8427 DOCREV CUR MEDS BY ELIG CLIN: HCPCS | Performed by: CLINICAL NURSE SPECIALIST

## 2024-06-18 PROCEDURE — 93000 ELECTROCARDIOGRAM COMPLETE: CPT | Performed by: CLINICAL NURSE SPECIALIST

## 2024-06-18 PROCEDURE — 3017F COLORECTAL CA SCREEN DOC REV: CPT | Performed by: CLINICAL NURSE SPECIALIST

## 2024-06-18 PROCEDURE — 3074F SYST BP LT 130 MM HG: CPT | Performed by: CLINICAL NURSE SPECIALIST

## 2024-06-18 RX ORDER — TIRZEPATIDE 10 MG/.5ML
10 INJECTION, SOLUTION SUBCUTANEOUS WEEKLY
COMMUNITY

## 2024-06-18 ASSESSMENT — ENCOUNTER SYMPTOMS
FACIAL SWELLING: 0
COUGH: 0
VOMITING: 0
EYE REDNESS: 0
WHEEZING: 0
ABDOMINAL PAIN: 0
SHORTNESS OF BREATH: 1
NAUSEA: 0
CHEST TIGHTNESS: 0

## 2024-06-18 NOTE — TELEPHONE ENCOUNTER
Please send letter for cardiac risk stratification for upcoming surgery for trigger finger to be done by Dr. Broderick

## 2024-06-18 NOTE — PATIENT INSTRUCTIONS
Return in about 6 months (around 12/18/2024) for Dr. Dumont.   Letter to Dr Broderick for trigger finger surgery    Encourage weight loss and regular exercise    Keep diabetes under control to help prevent further heart disease.  Keep Hemoglobin A1C less than 7%.    LDL cholesterol ( bad cholesterol) goal is less than 70 with a history of heart disease.  Consider Zetia if not at goal

## 2024-06-18 NOTE — PROGRESS NOTES
[facility], [facility]  [address] [address], [place] [address]  Phone: [phone]  Fax: [phone]    OFFICE VISIT:  2024    Evon Glover - : 1960    Reason For Visit:  Evon is a 63 y.o. female who is here for Follow-up (No cardiac symptoms), Coronary artery disease involving native coronary artery of, and Cardiac Clearance (Dr Broderick, Rt trigger finger surgery, 24)       Diagnosis Orders   1. Coronary artery disease involving native coronary artery of native heart without angina pectoris  EKG 12 lead      2. Left ventricular systolic dysfunction  EKG 12 lead      3. Mixed hyperlipidemia  EKG 12 lead      4. Essential hypertension  EKG 12 lead            HPI  Patient is here follow-up with a history of CAD (stent LAD , w LVEF 20%)  Last heart cath on 2019 showed a patent stent and normal LV function with minimal disease.      She denies chest pain, unusual dyspnea, orthopnea, PND, edema, palpitations, or sudden weight gain.  She has lost 40 pounds with the help of Mounjaro    Patient is here for cardiac risk stratification for an upcoming trigger finger surgery to be done by Dr. Broderick    Trigger finger, wgt loss of 40lbs     Rishi Ward MD is PCP.  Evon Glover has the following history as recorded in [facility]:    Patient Active Problem List    Diagnosis Date Noted    H/O heart artery stent 2019    Psoriatic arthritis (HCC) 10/14/2021    Fibromyalgia 10/14/2021    Type 2 diabetes mellitus without complication, without long-term current use of insulin ([facility]) 10/14/2021    Class 1 obesity due to excess calories in adult 2019    Mixed hyperlipidemia 2017    Bilateral carpal tunnel syndrome 2016    CAD (coronary artery disease) 2013    HTN (hypertension)      Past Medical History:   Diagnosis Date    CAD (coronary artery disease) 2013    EF 20-25% , EF 50-55% ; [facility] cardiology    Carpal tunnel syndrome

## 2024-07-11 ENCOUNTER — HOSPITAL ENCOUNTER (OUTPATIENT)
Dept: PREADMISSION TESTING | Age: 64
Discharge: HOME | End: 2024-07-11

## 2024-08-01 ENCOUNTER — OFFICE VISIT (OUTPATIENT)
Dept: ENT CLINIC | Age: 64
End: 2024-08-01
Payer: COMMERCIAL

## 2024-08-01 VITALS
HEIGHT: 66 IN | SYSTOLIC BLOOD PRESSURE: 110 MMHG | WEIGHT: 175.2 LBS | DIASTOLIC BLOOD PRESSURE: 70 MMHG | BODY MASS INDEX: 28.16 KG/M2

## 2024-08-01 DIAGNOSIS — R09.81 NASAL CONGESTION: ICD-10-CM

## 2024-08-01 DIAGNOSIS — Z91.09 ENVIRONMENTAL ALLERGIES: ICD-10-CM

## 2024-08-01 DIAGNOSIS — H69.93 EUSTACHIAN TUBE DYSFUNCTION, BILATERAL: Primary | ICD-10-CM

## 2024-08-01 PROCEDURE — 99203 OFFICE O/P NEW LOW 30 MIN: CPT | Performed by: NURSE PRACTITIONER

## 2024-08-01 PROCEDURE — 92504 EAR MICROSCOPY EXAMINATION: CPT | Performed by: NURSE PRACTITIONER

## 2024-08-01 PROCEDURE — 3078F DIAST BP <80 MM HG: CPT | Performed by: NURSE PRACTITIONER

## 2024-08-01 PROCEDURE — 3074F SYST BP LT 130 MM HG: CPT | Performed by: NURSE PRACTITIONER

## 2024-08-01 RX ORDER — MONTELUKAST SODIUM 10 MG/1
10 TABLET ORAL NIGHTLY
Qty: 30 TABLET | Refills: 5 | Status: SHIPPED | OUTPATIENT
Start: 2024-08-01

## 2024-08-01 RX ORDER — LEVOCETIRIZINE DIHYDROCHLORIDE 5 MG/1
5 TABLET, FILM COATED ORAL NIGHTLY
Qty: 30 TABLET | Refills: 5 | Status: SHIPPED | OUTPATIENT
Start: 2024-08-01

## 2024-08-01 ASSESSMENT — ENCOUNTER SYMPTOMS
RHINORRHEA: 1
EYES NEGATIVE: 1
GASTROINTESTINAL NEGATIVE: 1
RESPIRATORY NEGATIVE: 1
ALLERGIC/IMMUNOLOGIC NEGATIVE: 1
SINUS PRESSURE: 1

## 2024-08-01 NOTE — PROGRESS NOTES
2024    Evon Glover (:  1960) is a 63 y.o. female, Established patient, here for evaluation of the following chief complaint(s):  New Patient (Having trouble with R ear. Can barely hear out of it. )      Vitals:    24 1102   BP: 110/70   Site: Right Upper Arm   Position: Sitting   Cuff Size: Medium Adult   Weight: 79.5 kg (175 lb 3.2 oz)   Height: 1.676 m (5' 6\")       Wt Readings from Last 3 Encounters:   24 79.5 kg (175 lb 3.2 oz)   24 78.5 kg (173 lb)   24 86.2 kg (190 lb)       BP Readings from Last 3 Encounters:   24 110/70   24 112/70   24 (!) 149/73         SUBJECTIVE/OBJECTIVE:    Patient seen today for her ears. She states that about 2 months ago her right ear started to itch and then it started to feel stopped up. She reports muffled hearing. She states that she went to her PCP when this started and was given Flonase, azithromycin, and ear drops. She went back to her PCP because these did not help. She was given a medrol dose pack and Astelin nasal spray. She denies improvement. She notes symptoms starting in her left ear about 3 weeks ago. She also complains of nasal congestion and sinus pressure that started around the same time that her ear started to bother her. She also notes drainage. She reports history of sinus surgery several years ago.        Review of Systems   Constitutional: Negative.    HENT:  Positive for congestion, ear pain (fullness), hearing loss (muffled), postnasal drip, rhinorrhea and sinus pressure.         Ear itching   Eyes: Negative.    Respiratory: Negative.     Cardiovascular: Negative.    Gastrointestinal: Negative.    Endocrine: Negative.    Musculoskeletal: Negative.    Skin: Negative.    Allergic/Immunologic: Negative.    Neurological: Negative.    Hematological: Negative.    Psychiatric/Behavioral: Negative.          Physical Exam  Vitals reviewed.   Constitutional:       Appearance: Normal appearance. She is

## 2024-08-26 ENCOUNTER — HOSPITAL ENCOUNTER (OUTPATIENT)
Dept: CT IMAGING | Age: 64
Discharge: HOME | End: 2024-08-26
Payer: COMMERCIAL

## 2024-08-26 DIAGNOSIS — R09.81 NASAL CONGESTION: ICD-10-CM

## 2024-08-26 PROCEDURE — 70486 CT MAXILLOFACIAL W/O DYE: CPT

## 2024-09-04 ENCOUNTER — HOSPITAL ENCOUNTER (OUTPATIENT)
Dept: NEUROLOGY | Age: 64
Discharge: HOME | End: 2024-09-04
Payer: COMMERCIAL

## 2024-09-04 DIAGNOSIS — G56.03 CARPAL TUNNEL SYNDROME, BILATERAL UPPER LIMBS: ICD-10-CM

## 2024-09-04 PROCEDURE — 95886 MUSC TEST DONE W/N TEST COMP: CPT | Performed by: PSYCHIATRY & NEUROLOGY

## 2024-09-04 PROCEDURE — 95911 NRV CNDJ TEST 9-10 STUDIES: CPT | Performed by: PSYCHIATRY & NEUROLOGY

## 2024-09-04 PROCEDURE — 95886 MUSC TEST DONE W/N TEST COMP: CPT

## 2024-09-04 PROCEDURE — 95911 NRV CNDJ TEST 9-10 STUDIES: CPT

## 2024-09-05 NOTE — PROCEDURES
C6-C8 N None None None None N N N N   R. Deltoid Axillary C5-C6 N None None None None N N N N

## 2024-09-12 ENCOUNTER — OFFICE VISIT (OUTPATIENT)
Dept: ENT CLINIC | Age: 64
End: 2024-09-12
Payer: COMMERCIAL

## 2024-09-12 VITALS
HEIGHT: 66 IN | BODY MASS INDEX: 27.64 KG/M2 | DIASTOLIC BLOOD PRESSURE: 78 MMHG | WEIGHT: 172 LBS | SYSTOLIC BLOOD PRESSURE: 120 MMHG

## 2024-09-12 DIAGNOSIS — Z91.09 ENVIRONMENTAL ALLERGIES: ICD-10-CM

## 2024-09-12 DIAGNOSIS — H93.8X3 SENSATION OF FULLNESS IN EAR, BILATERAL: Primary | ICD-10-CM

## 2024-09-12 DIAGNOSIS — L29.9 EAR ITCHING: ICD-10-CM

## 2024-09-12 DIAGNOSIS — H91.93 DECREASED HEARING OF BOTH EARS: ICD-10-CM

## 2024-09-12 PROCEDURE — 3074F SYST BP LT 130 MM HG: CPT | Performed by: NURSE PRACTITIONER

## 2024-09-12 PROCEDURE — 99213 OFFICE O/P EST LOW 20 MIN: CPT | Performed by: NURSE PRACTITIONER

## 2024-09-12 PROCEDURE — 3078F DIAST BP <80 MM HG: CPT | Performed by: NURSE PRACTITIONER

## 2024-09-12 RX ORDER — FLUOCINOLONE ACETONIDE 0.11 MG/ML
4 OIL AURICULAR (OTIC) 2 TIMES DAILY
Qty: 20 ML | Refills: 1 | Status: SHIPPED | OUTPATIENT
Start: 2024-09-12

## 2024-09-12 ASSESSMENT — ENCOUNTER SYMPTOMS
RESPIRATORY NEGATIVE: 1
ALLERGIC/IMMUNOLOGIC NEGATIVE: 1
EYES NEGATIVE: 1
GASTROINTESTINAL NEGATIVE: 1

## 2024-10-07 ENCOUNTER — OFFICE VISIT (OUTPATIENT)
Dept: ENT CLINIC | Age: 64
End: 2024-10-07
Payer: COMMERCIAL

## 2024-10-07 VITALS
WEIGHT: 171 LBS | SYSTOLIC BLOOD PRESSURE: 120 MMHG | HEIGHT: 66 IN | BODY MASS INDEX: 27.48 KG/M2 | DIASTOLIC BLOOD PRESSURE: 80 MMHG

## 2024-10-07 DIAGNOSIS — L29.9 EAR ITCHING: ICD-10-CM

## 2024-10-07 DIAGNOSIS — H90.6 MIXED HEARING LOSS, BILATERAL: ICD-10-CM

## 2024-10-07 DIAGNOSIS — H91.8X3 ASYMMETRICAL HEARING LOSS: ICD-10-CM

## 2024-10-07 DIAGNOSIS — H93.8X3 SENSATION OF FULLNESS IN EAR, BILATERAL: Primary | ICD-10-CM

## 2024-10-07 DIAGNOSIS — H69.93 EUSTACHIAN TUBE DYSFUNCTION, BILATERAL: ICD-10-CM

## 2024-10-07 DIAGNOSIS — H91.93 DECREASED HEARING OF BOTH EARS: ICD-10-CM

## 2024-10-07 DIAGNOSIS — Z91.09 ENVIRONMENTAL ALLERGIES: ICD-10-CM

## 2024-10-07 PROCEDURE — 3074F SYST BP LT 130 MM HG: CPT | Performed by: NURSE PRACTITIONER

## 2024-10-07 PROCEDURE — 3079F DIAST BP 80-89 MM HG: CPT | Performed by: NURSE PRACTITIONER

## 2024-10-07 PROCEDURE — 99213 OFFICE O/P EST LOW 20 MIN: CPT | Performed by: NURSE PRACTITIONER

## 2024-10-07 RX ORDER — MONTELUKAST SODIUM 10 MG/1
10 TABLET ORAL NIGHTLY
Qty: 30 TABLET | Refills: 5 | Status: SHIPPED | OUTPATIENT
Start: 2024-10-07

## 2024-10-07 RX ORDER — LEVOCETIRIZINE DIHYDROCHLORIDE 5 MG/1
5 TABLET, FILM COATED ORAL NIGHTLY
Qty: 30 TABLET | Refills: 5 | Status: SHIPPED | OUTPATIENT
Start: 2024-10-07

## 2024-10-07 ASSESSMENT — ENCOUNTER SYMPTOMS
ALLERGIC/IMMUNOLOGIC NEGATIVE: 1
GASTROINTESTINAL NEGATIVE: 1
RESPIRATORY NEGATIVE: 1
EYES NEGATIVE: 1

## 2024-10-07 NOTE — PROGRESS NOTES
10/7/2024    Evon Glover (:  1960) is a 63 y.o. female, Established patient, here for evaluation of the following chief complaint(s):  Follow-up (EARS)      Vitals:    10/07/24 0917   BP: 120/80   Weight: 77.6 kg (171 lb)   Height: 1.676 m (5' 6\")       Wt Readings from Last 3 Encounters:   10/07/24 77.6 kg (171 lb)   24 78 kg (172 lb)   24 79.5 kg (175 lb 3.2 oz)       BP Readings from Last 3 Encounters:   10/07/24 120/80   24 120/78   24 110/70         SUBJECTIVE/OBJECTIVE:    Patient seen today for follow up of her ears and allergies. She states that her allergies are well controlled on Xyzal and montelukast. She denies nasal congestion, drainage, or sinus pressure. She does still complain of muffled hearing and aural fullness. She denies ear pain or tinnitus. She states that the Dermotic drops do help with the ear itching. She had a hearing test done at [facility] 24 that showed asymmetrical loss in the lower speech frequencies in the right ear and normal to mild hearing loss in the left ear.         Review of Systems   Constitutional: Negative.    HENT:  Positive for ear pain (aural fullness) and hearing loss (muffled).    Eyes: Negative.    Respiratory: Negative.     Cardiovascular: Negative.    Gastrointestinal: Negative.    Endocrine: Negative.    Musculoskeletal: Negative.    Skin: Negative.    Allergic/Immunologic: Negative.    Neurological: Negative.    Hematological: Negative.    Psychiatric/Behavioral: Negative.          Physical Exam  Vitals reviewed.   Constitutional:       Appearance: Normal appearance. She is normal weight.   HENT:      Head: Normocephalic and atraumatic.      Right Ear: Tympanic membrane, ear canal and external ear normal.      Left Ear: Tympanic membrane, ear canal and external ear normal.      Nose: Nose normal.      Mouth/Throat:      Mouth: Mucous membranes are moist.      Pharynx: Oropharynx is clear.   Eyes:      Extraocular

## 2024-10-26 DIAGNOSIS — L29.9 EAR ITCHING: ICD-10-CM

## 2024-10-28 RX ORDER — FLUOCINOLONE ACETONIDE 0.11 MG/ML
OIL AURICULAR (OTIC)
Qty: 20 ML | Refills: 1 | Status: SHIPPED | OUTPATIENT
Start: 2024-10-28

## 2024-10-31 ENCOUNTER — HOSPITAL ENCOUNTER (OUTPATIENT)
Dept: CT IMAGING | Age: 64
Discharge: HOME | End: 2024-10-31
Payer: COMMERCIAL

## 2024-10-31 DIAGNOSIS — H93.8X3 SENSATION OF FULLNESS IN EAR, BILATERAL: ICD-10-CM

## 2024-10-31 DIAGNOSIS — H91.93 DECREASED HEARING OF BOTH EARS: ICD-10-CM

## 2024-10-31 DIAGNOSIS — H90.6 MIXED HEARING LOSS, BILATERAL: ICD-10-CM

## 2024-10-31 DIAGNOSIS — H91.8X3 ASYMMETRICAL HEARING LOSS: ICD-10-CM

## 2024-10-31 LAB
CREAT SERPL-MCNC: 0.8 MG/DL (ref 0.3–1.3)
PERFORMED ON: NORMAL

## 2024-10-31 PROCEDURE — 70481 CT ORBIT/EAR/FOSSA W/DYE: CPT

## 2024-10-31 PROCEDURE — 6360000004 HC RX CONTRAST MEDICATION: Performed by: NURSE PRACTITIONER

## 2024-10-31 PROCEDURE — 82565 ASSAY OF CREATININE: CPT

## 2024-10-31 RX ORDER — IOPAMIDOL 755 MG/ML
75 INJECTION, SOLUTION INTRAVASCULAR
Status: COMPLETED | OUTPATIENT
Start: 2024-10-31 | End: 2024-10-31

## 2024-10-31 RX ADMIN — IOPAMIDOL 75 ML: 755 INJECTION, SOLUTION INTRAVENOUS at 13:43

## 2024-11-04 ENCOUNTER — OFFICE VISIT (OUTPATIENT)
Dept: ENT CLINIC | Age: 64
End: 2024-11-04
Payer: COMMERCIAL

## 2024-11-04 VITALS
WEIGHT: 168 LBS | DIASTOLIC BLOOD PRESSURE: 68 MMHG | BODY MASS INDEX: 27 KG/M2 | HEIGHT: 66 IN | SYSTOLIC BLOOD PRESSURE: 112 MMHG

## 2024-11-04 DIAGNOSIS — Z91.09 ENVIRONMENTAL ALLERGIES: ICD-10-CM

## 2024-11-04 DIAGNOSIS — H93.8X3 SENSATION OF FULLNESS IN EAR, BILATERAL: ICD-10-CM

## 2024-11-04 DIAGNOSIS — L29.9 EAR ITCHING: ICD-10-CM

## 2024-11-04 DIAGNOSIS — H90.6 MIXED HEARING LOSS, BILATERAL: Primary | ICD-10-CM

## 2024-11-04 DIAGNOSIS — H91.8X3 ASYMMETRICAL HEARING LOSS: ICD-10-CM

## 2024-11-04 PROCEDURE — 3078F DIAST BP <80 MM HG: CPT | Performed by: NURSE PRACTITIONER

## 2024-11-04 PROCEDURE — 3074F SYST BP LT 130 MM HG: CPT | Performed by: NURSE PRACTITIONER

## 2024-11-04 PROCEDURE — 99213 OFFICE O/P EST LOW 20 MIN: CPT | Performed by: NURSE PRACTITIONER

## 2024-11-04 NOTE — PROGRESS NOTES
2024    Evon Glover (:  1960) is a 64 y.o. female, Established patient, here for evaluation of the following chief complaint(s):  Follow-up (EARS)      Vitals:    24 0844   BP: 112/68   Weight: 76.2 kg (168 lb)   Height: 1.676 m (5' 6\")       Wt Readings from Last 3 Encounters:   24 76.2 kg (168 lb)   10/07/24 77.6 kg (171 lb)   24 78 kg (172 lb)       BP Readings from Last 3 Encounters:   24 112/68   10/07/24 120/80   24 120/78         SUBJECTIVE/OBJECTIVE:    Patient seen today for follow up of her ears. She had a CT of the IAC done that showed trace bilateral mastoid effusions. She denies drainage or tinnitus. She reports an occasional dull ear pain every once in a while. She denies it being worse with chewing or opening her mouth. She complains of muffled hearing and fullness that is worse on the right. She reports her allergies are well controlled with Xyzal and montelukast. She states that the Dermotic drops help with the ear itching.         Review of Systems   Constitutional: Negative.    HENT:  Positive for ear pain (fullness) and hearing loss (muffled).         Intermittent ear itching.   Eyes: Negative.    Respiratory: Negative.     Cardiovascular: Negative.    Gastrointestinal: Negative.    Endocrine: Negative.    Musculoskeletal: Negative.    Skin: Negative.    Allergic/Immunologic: Negative.    Neurological: Negative.    Hematological: Negative.    Psychiatric/Behavioral: Negative.          Physical Exam  Vitals reviewed.   Constitutional:       Appearance: Normal appearance. She is normal weight.   HENT:      Head: Normocephalic and atraumatic.      Jaw: No tenderness.      Right Ear: Tympanic membrane, ear canal and external ear normal.      Left Ear: Tympanic membrane, ear canal and external ear normal.      Nose: Nose normal.      Mouth/Throat:      Mouth: Mucous membranes are moist.   Eyes:      Extraocular Movements: Extraocular movements

## 2024-11-13 DIAGNOSIS — M79.672 LEFT FOOT PAIN: Primary | ICD-10-CM

## 2024-11-13 DIAGNOSIS — M79.671 RIGHT FOOT PAIN: ICD-10-CM

## 2024-11-14 ENCOUNTER — OFFICE VISIT (OUTPATIENT)
Age: 64
End: 2024-11-14
Payer: COMMERCIAL

## 2024-11-14 VITALS — HEIGHT: 66 IN | BODY MASS INDEX: 26.2 KG/M2 | WEIGHT: 163 LBS

## 2024-11-14 DIAGNOSIS — M79.672 PAIN IN LEFT FOOT: ICD-10-CM

## 2024-11-14 DIAGNOSIS — M24.173 JOINT DERANGEMENT OF ANKLE OR FOOT: ICD-10-CM

## 2024-11-14 DIAGNOSIS — M20.11 HALLUX VALGUS (ACQUIRED), RIGHT FOOT: ICD-10-CM

## 2024-11-14 DIAGNOSIS — M20.12 HALLUX VALGUS (ACQUIRED), LEFT FOOT: ICD-10-CM

## 2024-11-14 DIAGNOSIS — M24.176 JOINT DERANGEMENT OF ANKLE OR FOOT: ICD-10-CM

## 2024-11-14 DIAGNOSIS — M79.671 PAIN IN RIGHT FOOT: ICD-10-CM

## 2024-11-14 DIAGNOSIS — M81.0 POST-MENOPAUSAL OSTEOPOROSIS: Primary | ICD-10-CM

## 2024-11-14 PROCEDURE — G8417 CALC BMI ABV UP PARAM F/U: HCPCS | Performed by: PODIATRIST

## 2024-11-14 PROCEDURE — 3017F COLORECTAL CA SCREEN DOC REV: CPT | Performed by: PODIATRIST

## 2024-11-14 PROCEDURE — 99214 OFFICE O/P EST MOD 30 MIN: CPT | Performed by: PODIATRIST

## 2024-11-14 PROCEDURE — G8484 FLU IMMUNIZE NO ADMIN: HCPCS | Performed by: PODIATRIST

## 2024-11-14 PROCEDURE — G8427 DOCREV CUR MEDS BY ELIG CLIN: HCPCS | Performed by: PODIATRIST

## 2024-11-14 PROCEDURE — 1036F TOBACCO NON-USER: CPT | Performed by: PODIATRIST

## 2024-11-14 RX ORDER — TIRZEPATIDE 12.5 MG/.5ML
INJECTION, SOLUTION SUBCUTANEOUS
COMMUNITY
Start: 2024-10-31

## 2024-11-14 RX ORDER — LEVOTHYROXINE SODIUM 100 UG/1
TABLET ORAL
COMMUNITY
Start: 2024-09-19

## 2024-11-14 RX ORDER — FLUTICASONE PROPIONATE 50 MCG
SPRAY, SUSPENSION (ML) NASAL
COMMUNITY

## 2024-11-14 RX ORDER — OXYBUTYNIN CHLORIDE 5 MG/1
TABLET, EXTENDED RELEASE ORAL
COMMUNITY
Start: 2024-08-21

## 2024-11-14 RX ORDER — BUPROPION HYDROCHLORIDE 150 MG/1
TABLET ORAL
COMMUNITY
Start: 2024-08-21

## 2024-11-14 NOTE — H&P (VIEW-ONLY)
[facility]  [facility]  [address]  [facility] [id number] [phone]     Patient: Dilia Fu   YOB: 1960   Date: 11/14/2024   Visit Type:  Consult    Body Part: Foot bilateral    When did the symptoms being/Date of Onset or date of surgery? years    Where did the injury happen? Other: na    How did the injury happen? bunions    If over 55, have you bernardo an Osteoporosis Screening in the last 2 years? No    History of Present Illness  Chief Complaint   Patient presents with   • Consultation     Bilateral foot       This is a 64 y.o. female  presents today complaining of bilateral foot pain.  Pain is in her great toe joints.  She relates of longstanding duration.  Progressively worse with time.  She has been using padding as well as wider shoe gear over the last year without success.  She does have history of total ankle arthroplasty with implant.  She is very happy with that procedure.    Review of Systems  System  Neg/Pos  Details  Constitutional  Negative  Chills, Fatigue, Fever and Night Sweats  Respiratory  Negative  Chest Pain, Cough and Dyspnea  Cardio   Negative  Leg Swelling  GI   Negative  Abdominal Pain, Constipation, Nausea and Vomiting     Negative  Urinary Incontinence   Endocrine  Negative  Weight Gain and Weight Loss  MS   Negative  Except as noted in HPI and Chief Complaint    Past Medical History:   Diagnosis Date   • CAD (coronary artery disease) 09/13/2013    EF 20-25% 2013, EF 50-55% 2016; [facility] cardiology   • Carpal tunnel syndrome     bilateral   • COVID-19 01/2022    slight headache and fatigue   • Diabetes mellitus (HCC)    • Fibromyalgia    • Heart disease    • HTN (hypertension)    • Hyperlipidemia    • Mixed hyperlipidemia 07/19/2017   • Osteoarthritis    • PONV (postoperative nausea and vomiting)    • Post-menopausal    • Sleep apnea     cpap - not tolerated, doesn't use   • Thyroid disease        Past Surgical History:   Procedure Laterality Date   • ANKLE FRACTURE SURGERY  2024   • CARDIAC CATHETERIZATION  13  MDL    angioplasty, stent to LAD.EF 20-25%   • CARPAL TUNNEL RELEASE Bilateral 2016    CARPAL TUNNEL RELEASE performed by Wiley Robb MD at [facility] OR   • FINGER TRIGGER RELEASE Bilateral 2022    BILATERAL MIDDLE TRIGGER FINGER A1 PULLEY RELEASE performed by Pieter Hyde MD at [facility] OR   • GASTROCNEMIUS RECESSION Left 2024    GASTROCNEMIUS RECESSION OF LEFT FOOT performed by Lion Macedo II, DPM at [facility] OR   • HAND SURGERY     • ORTHOPEDIC SURGERY      ankle fx. repair    • SINUS SURGERY     • TOTAL ANKLE ARTHROPLASTY N/A 2024    TOTAL ANKLE ARTHROPLASTY INFINITY IMPLANT WITH FLAT CUT TALUS performed by Lion Macedo II, DPM at [facility] OR      Social History     Socioeconomic History   • Marital status:      Spouse name: None   • Number of children: None   • Years of education: None   • Highest education level: None   Tobacco Use   • Smoking status: Former     Current packs/day: 0.00     Average packs/day: 2.0 packs/day for 30.0 years (60.0 ttl pk-yrs)     Types: Cigarettes     Start date: 1983     Quit date: 2013     Years since quittin.3   • Smokeless tobacco: Never   Vaping Use   • Vaping status: Never Used   Substance and Sexual Activity   • Alcohol use: No   • Drug use: No   • Sexual activity: Yes     Partners: Male   Social History Narrative     29 years second marriage    She has no children    Education high school    Religious by donn not presently attending a [employer]oro[employer]ism    She enjoys cooking and gardening    Remains moderately active but physically sedentary    Smoked one half pack per day quit 6 years ago    Denies alcohol consumption or substance usage     Social Determinants of Health      Received from [facility], [facility]    Family and Community Support    Received from Religious  [facility], [facility]    Abuse Screen    Received from [facility]    Housing Stability      Social History     Occupational History   • Not on file   Tobacco Use   • Smoking status: Former     Current packs/day: 0.00     Average packs/day: 2.0 packs/day for 30.0 years (60.0 ttl pk-yrs)     Types: Cigarettes     Start date: 1983     Quit date: 2013     Years since quittin.3   • Smokeless tobacco: Never   Vaping Use   • Vaping status: Never Used   Substance and Sexual Activity   • Alcohol use: No   • Drug use: No   • Sexual activity: Yes     Partners: Male        Tobacco Use      Smoking status: Former        Packs/day: 0.00        Years: 2.0 packs/day for 30.0 years (60.0 ttl pk-yrs)        Types: Cigarettes        Start date: 1983        Quit date: 2013        Years since quittin.3      Smokeless tobacco: Never     Family History   Problem Relation Age of Onset   • Heart Disease Father         Medications  Current Outpatient Medications   Medication Sig Dispense Refill   • MOUNJARO 12.5 MG/0.5ML SOAJ INJECT 1 SYRINGE SUBCUTANEOUSLY ONCE A WEEK     • levothyroxine (SYNTHROID) 100 MCG tablet      • oxyBUTYnin (DITROPAN-XL) 5 MG extended release tablet Take 1 tablet every day by oral route for 90 days.     • fluticasone (FLONASE) 50 MCG/ACT nasal spray Spray 1 spray every day by intranasal route.     • buPROPion (WELLBUTRIN XL) 150 MG extended release tablet Take 1 tablet every day by oral route for 90 days.     • fluocinolone (DERMOTIC) 0.01 % OIL oil INSTILL 4 DROPS IN EAR(S) TWICE  DAILY . DISCARD UNUSED PORTION  60 DAYS AFTER OPENING 20 mL 1   • levocetirizine (XYZAL) 5 MG tablet Take 1 tablet by mouth nightly 30 tablet 5   • montelukast (SINGULAIR) 10 MG tablet Take 1 tablet by mouth nightly 30 tablet 5   • nitroGLYCERIN (NITROSTAT) 0.4 MG SL tablet Place 1 tablet under the tongue every 5 minutes as needed for Chest pain  "25 tablet 3   • allopurinol (ZYLOPRIM) 100 MG tablet Take 2 tablets by mouth daily     • lisinopril (PRINIVIL;ZESTRIL) 2.5 MG tablet Take 1 tablet by mouth nightly Indications: High Blood Pressure     • simvastatin (ZOCOR) 40 MG tablet Take 1 tablet by mouth nightly Indications: CHANGES IN CHOLESTEROL (INACTIVE)     • citalopram (CELEXA) 40 MG tablet Take 1 tablet by mouth daily     • pantoprazole (PROTONIX) 40 MG tablet Take 1 tablet by mouth nightly     • folic acid (FOLVITE) 1 MG tablet Take 1 tablet by mouth nightly     • aspirin 81 MG tablet Take 1 tablet by mouth daily     • bisoprolol-hydroCHLOROthiazide (ZIAC) 5-6.25 MG per tablet Take 1 tablet by mouth daily Indications: High Blood Pressure       No current facility-administered medications for this visit.        Allergies  Allergies   Allergen Reactions   • Morphine Other (See Comments)     irritability   • Chantix [Varenicline] Hives   • Duloxetine Other (See Comments)   • Metformin And Related Nausea And Vomiting     GI upset        Ht 1.676 m (5' 6\")   Wt 73.9 kg (163 lb)   BMI 26.31 kg/m²      Physical Exam   Physical Examination:  General: The patient is a Well Nourished 64 y.o. female who is alert and oriented x3 in no distress. The patient is cooperative during the exam.  Psychological: Is a pleasant female, good mood and affect, answers questions appropriately.  Head and Neck: Normocephalic, Atraumatic. Neck supple, no evidence of masses.   Abdomen: Soft, nontender, nondistended.  Eyes: Perrla. Extraocular movements intact.   Respiratory: Rate and effort within normal limits.   Vascular: Palpable dorsalis pedis and posterior tibial pulse bilaterally.  Neurological: Adequate sharp and dull sensation bilateral lower extremities.  Dermatological: Skin is cool dry and supple with no open lesions bilateral lower extremities.  Adequate and equal pedal hair growth present bilaterally.  She does have erythema overlying the prominence of bone at the " medial aspect of the first metatarsal phalangeal joint bilaterally.  Musculoskeletal: She does have lateral deviation of the hallux.  Hypermobile first ray bilaterally.  She is hypermobile in the transverse plane at the first metatarsal cuneiform joint as well range of motion the first metatarsal phalangeal joint is adequate, pain-free without crepitus bilaterally.  Gait Examination: Within normal limits      Imaging  Xray Result (most recent):  XR FOOT LEFT (MIN 3 VIEWS) 11/14/2024    Narrative  Foot Xray    Foot Xray 3 views. left  taken in office today. Include AP, lateral, lateral oblique, reveal Decreased. Bone Density.  There is an Infinity total ankle arthroplasty noted on the lateral view as well as at the ankle on the AP and oblique views.  She does have a small plate over the anterior medial aspect of the tibia.  Her total ankle is in X alignment.  No lucency surrounding the implant.  She does have an increased first second intermetatarsal angle.  Increased hallux valgus angle.  Tibial sesamoid position is 6.  No significant arthritic changes at the first metatarsal phalangeal joint.. Image quality is excellent. Interpreted by Lion Macedo II, DPM    Hallux valgus acquired left foot  History of total ankle arthroplasty with implant left foot  Postmenopausal osteoporosis      Narrative & Impression  Foot Xray     Foot Xray 3 views. right  taken in office today. Include AP, lateral, lateral oblique, reveal Decreased. Bone Density.  Increased first second metatarsal angle increased hallux valgus angle.  Tibial sesamoid position is 6.  There is a large posterior calcaneal spur.  No significant arthritic changes.  No arthritic changes of the ankle seen on the lateral view.. Image quality is excellent. Interpreted by Lion Macedo II, DPM      Hallux valgus acquired right foot  Posterior calcaneal spur  Postmenopausal osteoporosis     Signing Physician Signing Date/Time   Lion Macedo II, DPM 11/15/2024   7:22 AM           Plan    ICD-10-CM    1. Post-menopausal osteoporosis  M81.0 DEXA BONE DENSITY 2 SITES      2. Hallux valgus (acquired), right foot  M20.11       3. Hallux valgus (acquired), left foot  M20.12       4. Pain in left foot  M79.672       5. Pain in right foot  M79.671       6. Joint derangement of ankle or foot  M24.173     M24.176            Plan Narrative  I discussed her condition with her today.  I reviewed x-rays with her today.  I did order a bone density for her and she will follow-up in the bone density clinic.  I did recommend calcium and vitamin D supplementation today.    We did discuss first tarsometatarsal and intercuneiform joint arthrodesis possible hallux phalangeal osteotomy with internal fixation and bone graft harvest calcaneus bilaterally.  Risks and benefits of this procedure were discussed.  Questions were answered.  Postoperative course complications were reviewed.  She does understand that this is a major surgery, performed under general anesthesia with an ankle block.  He does require 2 weeks nonweightbearing followed by 6 weeks weightbearing in a boot.  Risks and complications including but not limited to infection, nonunion, delayed healing, need for further surgery were discussed and reviewed in detail today.      Return for post ops.      Patient given educational materials - see patient instructions.   Discussed use, benefit, and side effects of prescribed medications.  All patient questions answered.  Pt voiced understanding. Patient agreed with treatment plan. Follow up as needed.    This dictation was generated by voice recognition computer software. Although all attempts are made to edit the dictation for accuracy, there may be errors in the transcription that are not intended.    Electronically signed by Lion Macedo II, DPM on 11/15/2024 at 7:22 AM

## 2024-11-14 NOTE — PROGRESS NOTES
bilaterally.  Musculoskeletal: She does have lateral deviation of the hallux.  Hypermobile first ray bilaterally.  She is hypermobile in the transverse plane at the first metatarsal cuneiform joint as well range of motion the first metatarsal phalangeal joint is adequate, pain-free without crepitus bilaterally.  Gait Examination: Within normal limits      Imaging  Xray Result (most recent):  XR FOOT LEFT (MIN 3 VIEWS) 11/14/2024    Narrative  Foot Xray    Foot Xray 3 views. left  taken in office today. Include AP, lateral, lateral oblique, reveal Decreased. Bone Density.  There is an Infinity total ankle arthroplasty noted on the lateral view as well as at the ankle on the AP and oblique views.  She does have a small plate over the anterior medial aspect of the tibia.  Her total ankle is in X alignment.  No lucency surrounding the implant.  She does have an increased first second intermetatarsal angle.  Increased hallux valgus angle.  Tibial sesamoid position is 6.  No significant arthritic changes at the first metatarsal phalangeal joint.. Image quality is excellent. Interpreted by Sacha Anderson II, DPM    Hallux valgus acquired left foot  History of total ankle arthroplasty with implant left foot  Postmenopausal osteoporosis      Narrative & Impression  Foot Xray     Foot Xray 3 views. right  taken in office today. Include AP, lateral, lateral oblique, reveal Decreased. Bone Density.  Increased first second metatarsal angle increased hallux valgus angle.  Tibial sesamoid position is 6.  There is a large posterior calcaneal spur.  No significant arthritic changes.  No arthritic changes of the ankle seen on the lateral view.. Image quality is excellent. Interpreted by Sacha Anderson II, DPM      Hallux valgus acquired right foot  Posterior calcaneal spur  Postmenopausal osteoporosis     Signing Physician Signing Date/Time   Sacha Anderson II, DPM 11/15/2024  7:22 AM           Plan    ICD-10-CM    1.

## 2024-11-25 DIAGNOSIS — M24.176 JOINT DERANGEMENT OF ANKLE OR FOOT: ICD-10-CM

## 2024-11-25 DIAGNOSIS — M20.11 HALLUX VALGUS (ACQUIRED), RIGHT FOOT: Primary | ICD-10-CM

## 2024-11-25 DIAGNOSIS — M79.671 RIGHT FOOT PAIN: ICD-10-CM

## 2024-11-25 DIAGNOSIS — M24.173 JOINT DERANGEMENT OF ANKLE OR FOOT: ICD-10-CM

## 2024-11-26 ENCOUNTER — HOSPITAL ENCOUNTER (OUTPATIENT)
Dept: WOMENS IMAGING | Age: 64
Discharge: HOME | End: 2024-11-26
Attending: PODIATRIST
Payer: COMMERCIAL

## 2024-11-26 ENCOUNTER — PRE-ADMISSION TESTING (OUTPATIENT)
Dept: PREADMISSION TESTING | Facility: HOSPITAL | Age: 64
End: 2024-11-26
Payer: COMMERCIAL

## 2024-11-26 VITALS
DIASTOLIC BLOOD PRESSURE: 65 MMHG | RESPIRATION RATE: 16 BRPM | SYSTOLIC BLOOD PRESSURE: 124 MMHG | WEIGHT: 169.09 LBS | BODY MASS INDEX: 28.17 KG/M2 | HEART RATE: 77 BPM | HEIGHT: 65 IN | OXYGEN SATURATION: 99 %

## 2024-11-26 DIAGNOSIS — M81.0 POST-MENOPAUSAL OSTEOPOROSIS: ICD-10-CM

## 2024-11-26 LAB
ANION GAP SERPL CALCULATED.3IONS-SCNC: 7 MMOL/L (ref 5–15)
BUN SERPL-MCNC: 21 MG/DL (ref 8–23)
BUN/CREAT SERPL: 22.8 (ref 7–25)
CALCIUM SPEC-SCNC: 9.4 MG/DL (ref 8.6–10.5)
CHLORIDE SERPL-SCNC: 106 MMOL/L (ref 98–107)
CO2 SERPL-SCNC: 30 MMOL/L (ref 22–29)
CREAT SERPL-MCNC: 0.92 MG/DL (ref 0.57–1)
DEPRECATED RDW RBC AUTO: 45.1 FL (ref 37–54)
EGFRCR SERPLBLD CKD-EPI 2021: 69.7 ML/MIN/1.73
ERYTHROCYTE [DISTWIDTH] IN BLOOD BY AUTOMATED COUNT: 13.1 % (ref 12.3–15.4)
GLUCOSE SERPL-MCNC: 82 MG/DL (ref 65–99)
HCT VFR BLD AUTO: 41.4 % (ref 34–46.6)
HGB BLD-MCNC: 13.1 G/DL (ref 12–15.9)
MCH RBC QN AUTO: 29.9 PG (ref 26.6–33)
MCHC RBC AUTO-ENTMCNC: 31.6 G/DL (ref 31.5–35.7)
MCV RBC AUTO: 94.5 FL (ref 79–97)
PLATELET # BLD AUTO: 246 10*3/MM3 (ref 140–450)
PMV BLD AUTO: 10.9 FL (ref 6–12)
POTASSIUM SERPL-SCNC: 4 MMOL/L (ref 3.5–5.2)
RBC # BLD AUTO: 4.38 10*6/MM3 (ref 3.77–5.28)
SODIUM SERPL-SCNC: 143 MMOL/L (ref 136–145)
WBC NRBC COR # BLD AUTO: 6.14 10*3/MM3 (ref 3.4–10.8)

## 2024-11-26 PROCEDURE — 85027 COMPLETE CBC AUTOMATED: CPT

## 2024-11-26 PROCEDURE — 36415 COLL VENOUS BLD VENIPUNCTURE: CPT

## 2024-11-26 PROCEDURE — 77080 DXA BONE DENSITY AXIAL: CPT

## 2024-11-26 PROCEDURE — 80048 BASIC METABOLIC PNL TOTAL CA: CPT

## 2024-11-26 PROCEDURE — 93005 ELECTROCARDIOGRAM TRACING: CPT

## 2024-11-26 RX ORDER — ACETAMINOPHEN 500 MG
1000 TABLET ORAL EVERY 6 HOURS PRN
COMMUNITY

## 2024-11-26 RX ORDER — LEVOTHYROXINE SODIUM 100 UG/1
100 TABLET ORAL DAILY
COMMUNITY

## 2024-11-26 RX ORDER — ASPIRIN 81 MG/1
81 TABLET, CHEWABLE ORAL DAILY
COMMUNITY

## 2024-11-26 RX ORDER — CITALOPRAM HYDROBROMIDE 40 MG/1
40 TABLET ORAL DAILY
COMMUNITY

## 2024-11-26 RX ORDER — SIMVASTATIN 40 MG
40 TABLET ORAL NIGHTLY
COMMUNITY

## 2024-11-26 RX ORDER — TIRZEPATIDE 12.5 MG/.5ML
INJECTION, SOLUTION SUBCUTANEOUS WEEKLY
COMMUNITY

## 2024-11-26 RX ORDER — FOLIC ACID 1 MG/1
2 TABLET ORAL NIGHTLY
COMMUNITY

## 2024-11-26 RX ORDER — PANTOPRAZOLE SODIUM 40 MG/1
40 TABLET, DELAYED RELEASE ORAL NIGHTLY
COMMUNITY

## 2024-11-26 RX ORDER — ALLOPURINOL 100 MG/1
200 TABLET ORAL DAILY
COMMUNITY

## 2024-11-26 RX ORDER — BISOPROLOL FUMARATE AND HYDROCHLOROTHIAZIDE 5; 6.25 MG/1; MG/1
1 TABLET ORAL DAILY
COMMUNITY

## 2024-11-26 RX ORDER — LISINOPRIL 2.5 MG/1
2.5 TABLET ORAL NIGHTLY
COMMUNITY

## 2024-11-26 NOTE — DISCHARGE INSTRUCTIONS
Preparing for Surgery  Follow these instructions before the procedure:  Several days or weeks before your procedure  Medication(s) you need to stop   1 week prior to surgery: Hold Mounjaro until after surgery      Ask your health care provider about:  Changing or stopping your regular medicines. This is especially important if you are taking diabetes medicines or blood thinners.  Taking medicines such as aspirin and ibuprofen. These medicines can thin your blood. Do not take these medicines unless your health care provider tells you to take them.  Taking over-the-counter medicines, vitamins, herbs, and supplements.    Contact your surgeon if you:  Develop a fever of more than 100.4°F (38°C) or other feelings of illness during the 48 hours before your surgery.  Have symptoms that get worse.  Have questions or concerns about your surgery.  If you are going home the same day of your surgery you will need to arrange for a responsible adult, age 18 years old or older, to drive you home from the hospital and stay with you for 24 hours. Verification of the  will be made prior to any procedure requiring sedation. You may not go home in a taxi or any form of public transportation by yourself.     Day before your procedure  Medication(s) you need to stop the day before your surgery: Hold Lisinopril for 24 Hours Prior to Surgery    24 hours before your procedure DO NOT drink alcoholic beverages or smoke.  24 hours before your procedure STOP taking Erectile Dysfunction medication (i.e.,Cialis, Viagra)   You may be asked to shower with a germ-killing soap.  Day of your procedure   You may take the following medication(s) the morning of surgery with a sip of water:  Take your Bisoprolol Day Of Surgery With A Sip Of Water.      8 hours before your scheduled arrival time, STOP all food, any dairy products, and full liquids. This includes hard candy, chewing gum or mints. This is extremely important to prevent serious  complications.     Up to 2 hours before your scheduled arrival time, you may have clear liquids no cream, powder, or pulp of any kind. Safe options are water, black coffee, plain tea, soda, Gatorade/Powerade, clear broth, apple juice.    2 hours before your scheduled arrival time, STOP drinking clear liquids.    You may need to take another shower with a germ-killing soap before you leave home in the morning. Do not use perfumes, colognes, or body lotions.  Wear comfortable loose-fitting clothing.  Remove all jewelry including body piercing and rings, dark colored nail polish, and make up prior to arrival at the hospital. Leave all valuables at home.   Bring your hearing aids if you rely on them.  Do not wear contact lenses. If you wear eyeglasses remember to bring a case to store them in while you are in surgery.  Do not use denture adhesives since you will be asked to remove them during your surgery.    You do not need to bring your home medications into the hospital.   Bring your sleep apnea device with you on the day of your surgery (if this applies to you).  If you have an Inspire implant for sleep apnea, please bring the remote with you on the day of surgery.  If you wear portable oxygen, bring it with you.   If you are staying overnight, you may bring a bag of items you may need such as slippers, robe and a change of clothes for your discharge. You may want to leave these items in the car until you are ready for them since your family will take your belongings when you leave the pre-operative area.  Arrive at the hospital as scheduled by the office. You will be asked to arrive 2 hours prior to your surgery time in order to prepare for your procedure.  When you arrive at the hospital  Go to the registration desk located at the main entrance of the hospital.  After registration is completed, you will be given a beeper and a sticker sheet. Take the stickers to Outpatient Surgery and place in the tray at the end  of the desk to notify the staff that you have arrived and registered.   Return to the lobby to wait. You are not always called back according to the time of arrival but rather the time your doctor will be ready.  When your beeper lights up and vibrates proceed through the double doors, under the stairs, and a member of the Outpatient Surgery staff will escort you to your preoperative room.   How to Use Chlorhexidine Before Surgery  Chlorhexidine gluconate (CHG) is a germ-killing (antiseptic) solution that is used to clean the skin. It can get rid of the bacteria that normally live on the skin and can keep them away for about 24 hours. To clean your skin with CHG, you may be given:  A CHG solution to use in the shower or as part of a sponge bath.  A prepackaged cloth that contains CHG.  Cleaning your skin with CHG may help lower the risk for infection:  While you are staying in the intensive care unit of the hospital.  If you have a vascular access, such as a central line, to provide short-term or long-term access to your veins.  If you have a catheter to drain urine from your bladder.  If you are on a ventilator. A ventilator is a machine that helps you breathe by moving air in and out of your lungs.  After surgery.  What are the risks?  Risks of using CHG include:  A skin reaction.  Hearing loss, if CHG gets in your ears and you have a perforated eardrum.  Eye injury, if CHG gets in your eyes and is not rinsed out.  The CHG product catching fire.  Make sure that you avoid smoking and flames after applying CHG to your skin.  Do not use CHG:  If you have a chlorhexidine allergy or have previously reacted to chlorhexidine.  On babies younger than 2 months of age.  How to use CHG solution  Use CHG only as told by your health care provider, and follow the instructions on the label.  Use the full amount of CHG as directed. Usually, this is one bottle.  During a shower    Follow these steps when using CHG solution during  a shower (unless your health care provider gives you different instructions):  Start the shower.  Use your normal soap and shampoo to wash your face and hair.  Turn off the shower or move out of the shower stream.  Pour the CHG onto a clean washcloth. Do not use any type of brush or rough-edged sponge.  Starting at your neck, lather your body down to your toes. Make sure you follow these instructions:  If you will be having surgery, pay special attention to the part of your body where you will be having surgery. Scrub this area for at least 1 minute.  Do not use CHG on your head or face. If the solution gets into your ears or eyes, rinse them well with water.  Avoid your genital area.  Avoid any areas of skin that have broken skin, cuts, or scrapes.  Scrub your back and under your arms. Make sure to wash skin folds.  Let the lather sit on your skin for 1-2 minutes or as long as told by your health care provider.  Thoroughly rinse your entire body in the shower. Make sure that all body creases and crevices are rinsed well.  Dry off with a clean towel. Do not put any substances on your body afterward--such as powder, lotion, or perfume--unless you are told to do so by your health care provider. Only use lotions that are recommended by the .  Put on clean clothes or pajamas.  If it is the night before your surgery, sleep in clean sheets.     During a sponge bath  Follow these steps when using CHG solution during a sponge bath (unless your health care provider gives you different instructions):  Use your normal soap and shampoo to wash your face and hair.  Pour the CHG onto a clean washcloth.  Starting at your neck, lather your body down to your toes. Make sure you follow these instructions:  If you will be having surgery, pay special attention to the part of your body where you will be having surgery. Scrub this area for at least 1 minute.  Do not use CHG on your head or face. If the solution gets into your  ears or eyes, rinse them well with water.  Avoid your genital area.  Avoid any areas of skin that have broken skin, cuts, or scrapes.  Scrub your back and under your arms. Make sure to wash skin folds.  Let the lather sit on your skin for 1-2 minutes or as long as told by your health care provider.  Using a different clean, wet washcloth, thoroughly rinse your entire body. Make sure that all body creases and crevices are rinsed well.  Dry off with a clean towel. Do not put any substances on your body afterward--such as powder, lotion, or perfume--unless you are told to do so by your health care provider. Only use lotions that are recommended by the .  Put on clean clothes or pajamas.  If it is the night before your surgery, sleep in clean sheets.  How to use CHG prepackaged cloths  Only use CHG cloths as told by your health care provider, and follow the instructions on the label.  Use the CHG cloth on clean, dry skin.  Do not use the CHG cloth on your head or face unless your health care provider tells you to.  When washing with the CHG cloth:  Avoid your genital area.  Avoid any areas of skin that have broken skin, cuts, or scrapes.  Before surgery    Follow these steps when using a CHG cloth to clean before surgery (unless your health care provider gives you different instructions):  Using the CHG cloth, vigorously scrub the part of your body where you will be having surgery. Scrub using a back-and-forth motion for 3 minutes. The area on your body should be completely wet with CHG when you are done scrubbing.  Do not rinse. Discard the cloth and let the area air-dry. Do not put any substances on the area afterward, such as powder, lotion, or perfume.  Put on clean clothes or pajamas.  If it is the night before your surgery, sleep in clean sheets.     For general bathing  Follow these steps when using CHG cloths for general bathing (unless your health care provider gives you different  instructions).  Use a separate CHG cloth for each area of your body. Make sure you wash between any folds of skin and between your fingers and toes. Wash your body in the following order, switching to a new cloth after each step:  The front of your neck, shoulders, and chest.  Both of your arms, under your arms, and your hands.  Your stomach and groin area, avoiding the genitals.  Your right leg and foot.  Your left leg and foot.  The back of your neck, your back, and your buttocks.  Do not rinse. Discard the cloth and let the area air-dry. Do not put any substances on your body afterward--such as powder, lotion, or perfume--unless you are told to do so by your health care provider. Only use lotions that are recommended by the .  Put on clean clothes or pajamas.  Contact a health care provider if:  Your skin gets irritated after scrubbing.  You have questions about using your solution or cloth.  You swallow any chlorhexidine. Call your local poison control center (1-818.751.4429 in the [place]  Get help right away if:  Your eyes itch badly, or they become very red or swollen.  Your skin itches badly and is red or swollen.  Your hearing changes.  You have trouble seeing.  You have swelling or tingling in your mouth or throat.  You have trouble breathing.  These symptoms may represent a serious problem that is an emergency. Do not wait to see if the symptoms will go away. Get medical help right away. Call your local emergency services (081 in the [place] Do not drive yourself to the hospital.  Summary  Chlorhexidine gluconate (CHG) is a germ-killing (antiseptic) solution that is used to clean the skin. Cleaning your skin with CHG may help to lower your risk for infection.  You may be given CHG to use for bathing. It may be in a bottle or in a prepackaged cloth to use on your skin. Carefully follow your health care provider's instructions and the instructions on the product label.  Do not use CHG if you have  a chlorhexidine allergy.  Contact your health care provider if your skin gets irritated after scrubbing.  This information is not intended to replace advice given to you by your health care provider. Make sure you discuss any questions you have with your health care provider.  Document Revised: 04/17/2023 Document Reviewed: 02/28/2022  Elsevier Patient Education © 2023 [employer]

## 2024-11-27 LAB
QT INTERVAL: 426 MS
QTC INTERVAL: 466 MS

## 2024-12-02 ENCOUNTER — TELEPHONE (OUTPATIENT)
Age: 64
End: 2024-12-02

## 2024-12-03 ENCOUNTER — ANESTHESIA EVENT (OUTPATIENT)
Dept: PERIOP | Facility: HOSPITAL | Age: 64
End: 2024-12-03
Payer: COMMERCIAL

## 2024-12-03 ENCOUNTER — ANESTHESIA (OUTPATIENT)
Dept: PERIOP | Facility: HOSPITAL | Age: 64
End: 2024-12-03
Payer: COMMERCIAL

## 2024-12-03 ENCOUNTER — APPOINTMENT (OUTPATIENT)
Dept: GENERAL RADIOLOGY | Facility: HOSPITAL | Age: 64
End: 2024-12-03
Payer: COMMERCIAL

## 2024-12-03 ENCOUNTER — HOSPITAL ENCOUNTER (OUTPATIENT)
Facility: HOSPITAL | Age: 64
Setting detail: HOSPITAL OUTPATIENT SURGERY
Discharge: HOME | End: 2024-12-03
Attending: PODIATRIST | Admitting: PODIATRIST
Payer: COMMERCIAL

## 2024-12-03 VITALS
RESPIRATION RATE: 16 BRPM | HEART RATE: 70 BPM | TEMPERATURE: 98.4 F | HEIGHT: 65 IN | SYSTOLIC BLOOD PRESSURE: 121 MMHG | BODY MASS INDEX: 28.17 KG/M2 | DIASTOLIC BLOOD PRESSURE: 61 MMHG | WEIGHT: 169.09 LBS | OXYGEN SATURATION: 94 %

## 2024-12-03 DIAGNOSIS — M20.11 HALLUX VALGUS (ACQUIRED), RIGHT FOOT: Primary | ICD-10-CM

## 2024-12-03 LAB
GLUCOSE BLDC GLUCOMTR-MCNC: 104 MG/DL (ref 70–130)
GLUCOSE BLDC GLUCOMTR-MCNC: 108 MG/DL (ref 70–130)

## 2024-12-03 PROCEDURE — 25010000002 ONDANSETRON PER 1 MG: Performed by: NURSE ANESTHETIST, CERTIFIED REGISTERED

## 2024-12-03 PROCEDURE — 25010000002 FENTANYL CITRATE (PF) 100 MCG/2ML SOLUTION: Performed by: NURSE ANESTHETIST, CERTIFIED REGISTERED

## 2024-12-03 PROCEDURE — 25010000002 PROPOFOL 10 MG/ML EMULSION

## 2024-12-03 PROCEDURE — 82948 REAGENT STRIP/BLOOD GLUCOSE: CPT | Performed by: FAMILY MEDICINE

## 2024-12-03 PROCEDURE — 76000 FLUOROSCOPY <1 HR PHYS/QHP: CPT

## 2024-12-03 PROCEDURE — C1713 ANCHOR/SCREW BN/BN,TIS/BN: HCPCS | Performed by: PODIATRIST

## 2024-12-03 PROCEDURE — 25010000002 LIDOCAINE PF 2% 2 % SOLUTION: Performed by: NURSE ANESTHETIST, CERTIFIED REGISTERED

## 2024-12-03 PROCEDURE — 25010000002 ROPIVACAINE PER 1 MG: Performed by: PODIATRIST

## 2024-12-03 PROCEDURE — 82948 REAGENT STRIP/BLOOD GLUCOSE: CPT

## 2024-12-03 PROCEDURE — 25010000002 PROPOFOL 10 MG/ML EMULSION: Performed by: NURSE ANESTHETIST, CERTIFIED REGISTERED

## 2024-12-03 PROCEDURE — 25010000002 CEFAZOLIN PER 500 MG: Performed by: PODIATRIST

## 2024-12-03 PROCEDURE — 25010000002 DEXAMETHASONE PER 1 MG: Performed by: NURSE ANESTHETIST, CERTIFIED REGISTERED

## 2024-12-03 PROCEDURE — 25810000003 LACTATED RINGERS PER 1000 ML: Performed by: PODIATRIST

## 2024-12-03 PROCEDURE — 73620 X-RAY EXAM OF FOOT: CPT

## 2024-12-03 DEVICE — RAPID COMPRESSION IMPLANTS
Type: IMPLANTABLE DEVICE | Site: FOOT | Status: FUNCTIONAL
Brand: LAPIPLASTY SPEEDPLATE

## 2024-12-03 DEVICE — ALLOGRFT DBM XCITE PUTTY FLO 2.5CC: Type: IMPLANTABLE DEVICE | Site: FOOT | Status: FUNCTIONAL

## 2024-12-03 DEVICE — 4.0MM FULLY THREADED SCREW - SHORT (32MM/36MM)
Type: IMPLANTABLE DEVICE | Site: FOOT | Status: FUNCTIONAL
Brand: LAPIPLASTY® 4.0MM FULLY THREADED SCREW

## 2024-12-03 RX ORDER — DEXAMETHASONE SODIUM PHOSPHATE 4 MG/ML
INJECTION, SOLUTION INTRA-ARTICULAR; INTRALESIONAL; INTRAMUSCULAR; INTRAVENOUS; SOFT TISSUE AS NEEDED
Status: DISCONTINUED | OUTPATIENT
Start: 2024-12-03 | End: 2024-12-03 | Stop reason: SURG

## 2024-12-03 RX ORDER — ROPIVACAINE HYDROCHLORIDE 5 MG/ML
INJECTION, SOLUTION EPIDURAL; INFILTRATION; PERINEURAL AS NEEDED
Status: DISCONTINUED | OUTPATIENT
Start: 2024-12-03 | End: 2024-12-03 | Stop reason: HOSPADM

## 2024-12-03 RX ORDER — LABETALOL HYDROCHLORIDE 5 MG/ML
5 INJECTION, SOLUTION INTRAVENOUS
Status: DISCONTINUED | OUTPATIENT
Start: 2024-12-03 | End: 2024-12-03 | Stop reason: HOSPADM

## 2024-12-03 RX ORDER — FENTANYL CITRATE 50 UG/ML
50 INJECTION, SOLUTION INTRAMUSCULAR; INTRAVENOUS
Status: DISCONTINUED | OUTPATIENT
Start: 2024-12-03 | End: 2024-12-03 | Stop reason: HOSPADM

## 2024-12-03 RX ORDER — SODIUM CHLORIDE 0.9 % (FLUSH) 0.9 %
3-10 SYRINGE (ML) INJECTION AS NEEDED
Status: DISCONTINUED | OUTPATIENT
Start: 2024-12-03 | End: 2024-12-03 | Stop reason: HOSPADM

## 2024-12-03 RX ORDER — ONDANSETRON 2 MG/ML
INJECTION INTRAMUSCULAR; INTRAVENOUS AS NEEDED
Status: DISCONTINUED | OUTPATIENT
Start: 2024-12-03 | End: 2024-12-03 | Stop reason: SURG

## 2024-12-03 RX ORDER — IBUPROFEN 600 MG/1
600 TABLET, FILM COATED ORAL EVERY 6 HOURS PRN
Status: DISCONTINUED | OUTPATIENT
Start: 2024-12-03 | End: 2024-12-03 | Stop reason: HOSPADM

## 2024-12-03 RX ORDER — ONDANSETRON 4 MG/1
4 TABLET, FILM COATED ORAL EVERY 4 HOURS
Qty: 20 TABLET | Refills: 0 | Status: SHIPPED | OUTPATIENT
Start: 2024-12-03

## 2024-12-03 RX ORDER — SODIUM CHLORIDE 0.9 % (FLUSH) 0.9 %
3 SYRINGE (ML) INJECTION EVERY 12 HOURS SCHEDULED
Status: DISCONTINUED | OUTPATIENT
Start: 2024-12-03 | End: 2024-12-03 | Stop reason: HOSPADM

## 2024-12-03 RX ORDER — SODIUM CHLORIDE 0.9 % (FLUSH) 0.9 %
3 SYRINGE (ML) INJECTION AS NEEDED
Status: DISCONTINUED | OUTPATIENT
Start: 2024-12-03 | End: 2024-12-03 | Stop reason: HOSPADM

## 2024-12-03 RX ORDER — EPHEDRINE SULFATE 50 MG/ML
INJECTION INTRAVENOUS AS NEEDED
Status: DISCONTINUED | OUTPATIENT
Start: 2024-12-03 | End: 2024-12-03 | Stop reason: SURG

## 2024-12-03 RX ORDER — LIDOCAINE HYDROCHLORIDE 10 MG/ML
0.5 INJECTION, SOLUTION EPIDURAL; INFILTRATION; INTRACAUDAL; PERINEURAL ONCE AS NEEDED
Status: DISCONTINUED | OUTPATIENT
Start: 2024-12-03 | End: 2024-12-03 | Stop reason: HOSPADM

## 2024-12-03 RX ORDER — LIDOCAINE HYDROCHLORIDE 20 MG/ML
INJECTION, SOLUTION EPIDURAL; INFILTRATION; INTRACAUDAL; PERINEURAL AS NEEDED
Status: DISCONTINUED | OUTPATIENT
Start: 2024-12-03 | End: 2024-12-03 | Stop reason: SURG

## 2024-12-03 RX ORDER — SODIUM CHLORIDE, SODIUM LACTATE, POTASSIUM CHLORIDE, CALCIUM CHLORIDE 600; 310; 30; 20 MG/100ML; MG/100ML; MG/100ML; MG/100ML
100 INJECTION, SOLUTION INTRAVENOUS CONTINUOUS
Status: DISCONTINUED | OUTPATIENT
Start: 2024-12-03 | End: 2024-12-03 | Stop reason: HOSPADM

## 2024-12-03 RX ORDER — SODIUM CHLORIDE, SODIUM LACTATE, POTASSIUM CHLORIDE, CALCIUM CHLORIDE 600; 310; 30; 20 MG/100ML; MG/100ML; MG/100ML; MG/100ML
20 INJECTION, SOLUTION INTRAVENOUS CONTINUOUS
Status: DISCONTINUED | OUTPATIENT
Start: 2024-12-03 | End: 2024-12-03 | Stop reason: HOSPADM

## 2024-12-03 RX ORDER — DOCUSATE SODIUM 100 MG/1
100 CAPSULE, LIQUID FILLED ORAL 2 TIMES DAILY
Qty: 60 CAPSULE | Refills: 0 | Status: SHIPPED | OUTPATIENT
Start: 2024-12-03

## 2024-12-03 RX ORDER — MIDAZOLAM HYDROCHLORIDE 2 MG/2ML
1 INJECTION, SOLUTION INTRAMUSCULAR; INTRAVENOUS
Status: DISCONTINUED | OUTPATIENT
Start: 2024-12-03 | End: 2024-12-03 | Stop reason: HOSPADM

## 2024-12-03 RX ORDER — HYDROCODONE BITARTRATE AND ACETAMINOPHEN 10; 325 MG/1; MG/1
1 TABLET ORAL EVERY 4 HOURS PRN
Qty: 20 TABLET | Refills: 0 | Status: SHIPPED | OUTPATIENT
Start: 2024-12-03 | End: 2025-01-02

## 2024-12-03 RX ORDER — SODIUM CHLORIDE 9 MG/ML
40 INJECTION, SOLUTION INTRAVENOUS AS NEEDED
Status: DISCONTINUED | OUTPATIENT
Start: 2024-12-03 | End: 2024-12-03 | Stop reason: HOSPADM

## 2024-12-03 RX ORDER — NALOXONE HCL 0.4 MG/ML
0.4 VIAL (ML) INJECTION AS NEEDED
Status: DISCONTINUED | OUTPATIENT
Start: 2024-12-03 | End: 2024-12-03 | Stop reason: HOSPADM

## 2024-12-03 RX ORDER — ENOXAPARIN SODIUM 100 MG/ML
40 INJECTION SUBCUTANEOUS DAILY
Qty: 12 ML | Refills: 0 | Status: SHIPPED | OUTPATIENT
Start: 2024-12-03 | End: 2025-01-02

## 2024-12-03 RX ORDER — PROPOFOL 10 MG/ML
VIAL (ML) INTRAVENOUS AS NEEDED
Status: DISCONTINUED | OUTPATIENT
Start: 2024-12-03 | End: 2024-12-03 | Stop reason: SURG

## 2024-12-03 RX ORDER — FLUMAZENIL 0.1 MG/ML
0.2 INJECTION INTRAVENOUS AS NEEDED
Status: DISCONTINUED | OUTPATIENT
Start: 2024-12-03 | End: 2024-12-03 | Stop reason: HOSPADM

## 2024-12-03 RX ORDER — ACETAMINOPHEN 500 MG
1000 TABLET ORAL ONCE
Status: COMPLETED | OUTPATIENT
Start: 2024-12-03 | End: 2024-12-03

## 2024-12-03 RX ORDER — HYDROCODONE BITARTRATE AND ACETAMINOPHEN 5; 325 MG/1; MG/1
1 TABLET ORAL EVERY 4 HOURS PRN
Status: DISCONTINUED | OUTPATIENT
Start: 2024-12-03 | End: 2024-12-03 | Stop reason: HOSPADM

## 2024-12-03 RX ORDER — FENTANYL CITRATE 50 UG/ML
INJECTION, SOLUTION INTRAMUSCULAR; INTRAVENOUS AS NEEDED
Status: DISCONTINUED | OUTPATIENT
Start: 2024-12-03 | End: 2024-12-03 | Stop reason: SURG

## 2024-12-03 RX ORDER — HYDROCODONE BITARTRATE AND ACETAMINOPHEN 10; 325 MG/1; MG/1
1 TABLET ORAL EVERY 4 HOURS PRN
Status: DISCONTINUED | OUTPATIENT
Start: 2024-12-03 | End: 2024-12-03 | Stop reason: HOSPADM

## 2024-12-03 RX ORDER — MAGNESIUM HYDROXIDE 1200 MG/15ML
LIQUID ORAL AS NEEDED
Status: DISCONTINUED | OUTPATIENT
Start: 2024-12-03 | End: 2024-12-03 | Stop reason: HOSPADM

## 2024-12-03 RX ORDER — ONDANSETRON 2 MG/ML
4 INJECTION INTRAMUSCULAR; INTRAVENOUS ONCE AS NEEDED
Status: DISCONTINUED | OUTPATIENT
Start: 2024-12-03 | End: 2024-12-03 | Stop reason: HOSPADM

## 2024-12-03 RX ADMIN — EPHEDRINE SULFATE 15 MG: 50 INJECTION INTRAVENOUS at 13:33

## 2024-12-03 RX ADMIN — DEXAMETHASONE SODIUM PHOSPHATE 4 MG: 4 INJECTION, SOLUTION INTRA-ARTICULAR; INTRALESIONAL; INTRAMUSCULAR; INTRAVENOUS; SOFT TISSUE at 13:29

## 2024-12-03 RX ADMIN — Medication 50 MG: at 13:39

## 2024-12-03 RX ADMIN — FENTANYL CITRATE 50 MCG: 50 INJECTION, SOLUTION INTRAMUSCULAR; INTRAVENOUS at 14:39

## 2024-12-03 RX ADMIN — CEFAZOLIN 2000 MG: 2 INJECTION, POWDER, FOR SOLUTION INTRAMUSCULAR; INTRAVENOUS at 13:28

## 2024-12-03 RX ADMIN — SODIUM CHLORIDE, POTASSIUM CHLORIDE, SODIUM LACTATE AND CALCIUM CHLORIDE 20 ML/HR: 600; 310; 30; 20 INJECTION, SOLUTION INTRAVENOUS at 10:35

## 2024-12-03 RX ADMIN — PROPOFOL 200 MCG/KG/MIN: 10 INJECTION, EMULSION INTRAVENOUS at 13:39

## 2024-12-03 RX ADMIN — FENTANYL CITRATE 50 MCG: 50 INJECTION, SOLUTION INTRAMUSCULAR; INTRAVENOUS at 14:37

## 2024-12-03 RX ADMIN — EPHEDRINE SULFATE 10 MG: 50 INJECTION INTRAVENOUS at 13:36

## 2024-12-03 RX ADMIN — PROPOFOL 200 MG: 10 INJECTION, EMULSION INTRAVENOUS at 13:24

## 2024-12-03 RX ADMIN — ACETAMINOPHEN 1000 MG: 500 TABLET, FILM COATED ORAL at 12:36

## 2024-12-03 RX ADMIN — LIDOCAINE HYDROCHLORIDE 100 MG: 20 INJECTION, SOLUTION EPIDURAL; INFILTRATION; INTRACAUDAL; PERINEURAL at 13:24

## 2024-12-03 RX ADMIN — FENTANYL CITRATE 50 MCG: 50 INJECTION, SOLUTION INTRAMUSCULAR; INTRAVENOUS at 13:29

## 2024-12-03 RX ADMIN — FENTANYL CITRATE 50 MCG: 50 INJECTION, SOLUTION INTRAMUSCULAR; INTRAVENOUS at 13:24

## 2024-12-03 RX ADMIN — ONDANSETRON 4 MG: 2 INJECTION INTRAMUSCULAR; INTRAVENOUS at 13:29

## 2024-12-03 NOTE — ANESTHESIA PREPROCEDURE EVALUATION
Anesthesia Evaluation     history of anesthetic complications:  PONV  NPO Solid Status: > 8 hours  NPO Liquid Status: > 8 hours           Airway   Mallampati: III  Possible difficult intubation  Dental      Pulmonary    (+) ,sleep apnea  Cardiovascular   Exercise tolerance: good (4-7 METS)    (+) hypertension, CAD, cardiac stents (1 stent placed 2013) more than 12 months ago , hyperlipidemia      Neuro/Psych  (-) seizures, TIA, CVA  GI/Hepatic/Renal/Endo    (+) GERD, diabetes mellitus, thyroid problem hypothyroidism  (-) liver disease, no renal disease    Musculoskeletal     Abdominal    Substance History      OB/GYN          Other   arthritis,         Phys Exam Other: Previously gr 1 view with mil 2                  Anesthesia Plan    ASA 3     general   total IV anesthesia  intravenous induction     Anesthetic plan, risks, benefits, and alternatives have been provided, discussed and informed consent has been obtained with: patient.        CODE STATUS:

## 2024-12-03 NOTE — ANESTHESIA POSTPROCEDURE EVALUATION
Chief complaint:  Botulinum Toxin injection with Spastic Hemiparesis affecting non-dominant side, units to be injected today: 200    Diagnosis: Spastic Hemiparesis affecting non-dominant side    Muscles identified for injection today:  At Left side:  Units injected:  Flexor digitorum Superficialis to Digit 2, 30 units  Flexor digitorum Superficialis to Digit 3, 30 units  Flexor digitorum Superficialis to Digit 4, 30 units  Flexor digitorum Superficialis to Digit 5, 30 units  Flexor Pollicus longus, 30 units  FCR + (Prior FCU), 50 units (As wrist not much flexed)    Note: EMG guidance was utilized.              Procedure:    A 'time out' was applied to identify the patient by name and date of birth.  The risks and benefits of the procedure were explained to the patient.   Informed consent included the review of prior tried measures, and their failure, and hence utilizing Botulinum Toxin to best achieve the goals of the treatment.    Adverse events, side effects were explained in detail, which included muscle weakness, pain, flu like symptoms, headaches, dry mouth, infection, septicemia, sudden death.  Additional FDA recommended warnings were also discussed, which included migration of toxin to other areas, leading to dysphagia or respiratory arrest.  The patient expressed understanding and verbalized agreement, and consented to the procedure.     The skin overlying the area of the injection was cleansed with alcohol and Botulinum Toxin was prepared with preservative-free normal saline and injected at the above mentioned muscles with EMG guidance:  The procedure was tolerated without difficulty.  Post procedure care and therapies for ROM was discussed.  A return appointment for a 3 week check-up was suggested, but he preferred to return after 15 weeks for repeat injections.    Many thanks for allowing us to participate in the care of your patient.    Sivakumar Oliveira MD  Dept. of PM [facility] R  Board Certified: American Board  "Patient: Daisy Fu    Procedure Summary       Date: 12/03/24 Room / Location:  [facility] OR [address] [place] [address] [facility] OR    Anesthesia Start: 1320 Anesthesia Stop: 1500    Procedures:       FIRST TARSOMETATARSAL AND INTERCUNEIFORM JOINT ARTHRODESIS, BONE GRAFT HARVEST CALCANEUS, HALLUX PHALANGEAL OSTEOTOMY WITH INTERNAL FIXATION (Right: Foot)      BONE GRAFT HARVEST CALCANEUS, HALLUX PHALANGEAL OSTEOTOM (Right: Foot)      HALLUX PHALANGEAL OSTEOTOMY WITH INTERNAL FIXATION (Right: Foot) Diagnosis:       Hallux valgus (acquired), right foot      Joint derangement of ankle or foot      Exostosis of toe      Pain in right foot      (Hallux valgus acquired right foot, joint derangement right foot, exostosis lateral aspect right hallux at the interphalangeal joint, right foot pain)    Surgeons: Lion Macedo DPM Provider: Kristen Johnson CRNA    Anesthesia Type: general ASA Status: 3            Anesthesia Type: general    Vitals  Vitals Value Taken Time   /80 12/03/24 1520   Temp 98.4 °F (36.9 °C) 12/03/24 1525   Pulse 73 12/03/24 1528   Resp 13 12/03/24 1520   SpO2 93 % 12/03/24 1528   Vitals shown include unfiled device data.        Post Anesthesia Care and Evaluation    Patient location during evaluation: PACU  Patient participation: complete - patient participated  Level of consciousness: awake and alert  Pain management: adequate    Airway patency: patent  Anesthetic complications: No anesthetic complications    Cardiovascular status: acceptable  Respiratory status: acceptable  Hydration status: acceptable    Comments: Blood pressure 119/61, pulse 71, temperature 98.4 °F (36.9 °C), resp. rate 16, height 166 cm (65.35\"), weight 76.7 kg (169 lb 1.5 oz), SpO2 93%.    Pt discharged from PACU based on alla score >8  No anesthesia care post op    " of PM [facility] R.  Board Certified: American Board of Electrodiagnostic Medicine.  Board Certified: American Board of Neuromuscular diseases.  Board Certified: American Board of Brain Injury Medicine.

## 2024-12-03 NOTE — ANESTHESIA PROCEDURE NOTES
Airway  Urgency: elective    Date/Time: 12/3/2024 1:25 PM  Airway not difficult    General Information and Staff    Patient location during procedure: OR    Indications and Patient Condition  Indications for airway management: airway protection    Preoxygenated: yes  Mask difficulty assessment: 0 - not attempted    Final Airway Details  Final airway type: supraglottic airway      Successful airway: I-gel  Size 4     Number of attempts at approach: 1  Assessment: lips, teeth, and gum same as pre-op and atraumatic intubation

## 2024-12-03 NOTE — OP NOTE
FOOT ARTHRODESIS, FOOT NAVICULAR EXCISION OR BONE GRAFT, BUNIONECTOMY  Procedure Note    Daisy Fu  12/3/2024    Pre-op Diagnosis:   Hallux valgus acquired right foot, joint derangement right foot, exostosis lateral aspect right hallux at the interphalangeal joint, right foot pain    Post-op Diagnosis:     Post-Op Diagnosis Codes:     * Hallux valgus (acquired), right foot [M20.11]     * Joint derangement of ankle or foot [M24.173, M24.176]     * Exostosis of toe [M89.8X7]     * Pain in right foot [M79.671]     Procedure/CPT Codes:       Procedure(s):  1) FIRST TARSOMETATARSAL AND INTERCUNEIFORM JOINT ARTHRODESIS RIGHT FOOT   2) BONE GRAFT HARVEST CALCANEUS minor  3) partial removal of bone lateral aspect right hallux proximal and distal phalanx    Surgeon(s):  Lion Macedo DPM    Anesthesia: General with Block    Staff:   Circulator: Ulises Olivares RN  Radiology Technologist: Florinda Hollis  Scrub Person: Beatriz Arango; Ulises Reid  Vendor Representative: Ulysses Molina  Assistant: Analisa Davenport  Assistant: Analisa Davenport  was responsible for performing the following activities: Retraction and their skilled assistance was necessary for the success of this case.    Indications for procedure:  Pain.  Difficulty tolerating shoe gear.    Procedure details:  The patient brought the operating room placed under general anesthesia.  An ankle block was performed with 30 cc 0.5% Naropin plain.  Right leg prepped and draped in usual sterile fashion.  Following procedures were performed.    Procedure 1 first tarsometatarsal and intercuneiform joint arthrodesis right foot    Attention was then directed the first tarsometatarsal joint where a linear incision was made.  Deepened with sharp and blunt dissection technique.  A linear capsular incision was made and the joint was exposed.  A sagittal saw was placed dorsal plantar through the joint.  The corner chisel was used to free the corner of the joint.   An osteotome was used to remove any free bone or cartilage.  The cut guide was then inserted.  A #1 wire was then placed at the base of the first metatarsal.  The frontal plane correction was performed through this and the positioner was tightened with the hallux in dorsiflexion.  X-ray exam was performed.  The cut guide was then secured.  The positioner was secured with the hallux in dorsiflexion.  The bone cuts were then made.  The K wire was removed from the positioner.  The cut guide was then removed.  The distractor was placed.  The joint was distracted open.  Remaining articular cartilage was removed.  Wound was irrigated.  Joint surface was fenestrated.    Procedure #2 bone graft harvest calcaneus right minor    Attention was then directed the lateral wall calcaneus where a linear incision was made.  Deepened with sharp and blunt dissection technique.  A linear periosteal incision was made.  A periosteal layer was used to elevate the periosteum.  A bone graft harvester from [facility] was used to harvest approximately 4 cc of bone graft in the calcaneus.  This was backfilled with cortical fiber putty.  Closure performed in layers.    Attention was then directed back to the first tarsometatarsal joint.  The bone graft was packed in the joint.  The joint was compressed with the hallux in dorsiflexion.  Temporary fixation was performed.  The compressor was then removed.  A 4 leg staple was then placed dorsally.  A 4 leg staple was placed medially.  X-ray exam was performed.  The intercuneiform joint was stressed and there was diastases at the intercuneiform joint.  A hallux interphalangeal joint arthrodesis was deemed necessary.    Dissection was carried to the intercuneiform joint.  A linear capsular incision was made.  The saw was placed dorsal plantar through the joint.  The articular cartilage was removed with an osteotome.  The joint surface was fenestrated.  The remainder of the cortical fiber bone putty was  packed into the arthrodesis site.  The arthrodesis site was then reduced and a screw was placed from the base of the first metatarsal into the intermediate cuneiform.  X-ray exam was performed.  Wound was irrigated.  Closure performed in layers.    Procedure #3 partial removal of bone lateral aspect of the right hallux distal and proximal phalanx    There remained a significant hyperkeratosis with underlying prominent bone at the lateral aspect of the right hallux.  2 converging semielliptical incisions were placed surrounding this hyperkeratosis and it was removed.  A linear capsular incision was made.  A sagittal saw was used to resect bone from the base of the distal phalanx as well as head of the proximal phalanx.  This was further remodeled with a hand rasp.  Capsular tissues were repaired with 2-0 Vicryl.  Subcutaneous tissues were repaired with 3-0 Vicryl and skin is repaired with 3-0 nylon.    Well-padded compression bandage with posterior splint was applied.    Estimated Blood Loss: none    Specimens:                None      Drains: * No LDAs found *    Implants:   Implant Name Type Inv. Item Serial No.  Lot No. LRB No. Used Action   ALLOGRFT DBM XCITE PUTTY CAROLYN 2.5CC - [id number] Implant ALLOGRFT DBM XCITE PUTTY CAROLYN 2.5CC  [facility] [id number] Right 1 Implanted   STPL BONE SPEEDPLATE RAPD/COMPR JACKELYN/4 [id number] - [id number] Implant STPL BONE SPEEDPLATE RAPD/COMPR JACKELYN/4 [id number]  [employer] [id number] Right 1 Implanted   STPL BONE SPEEDPLATE RAPD/COMPR JACKELYN/4 [id number] - [id number] Implant STPL BONE SPEEDPLATE RAPD/COMPR JACKELYN/4 [id number]  [employer] [id number] Right 1 Implanted        Complications: none           Follow up:   Nonweightbearing.  3 to 5 days for follow-up.  Prescriptions for Norco, Zofran, Lovenox were given    Lion Macedo DPM     Date: 12/3/2024  Time: 14:44 CST

## 2024-12-04 ENCOUNTER — TELEPHONE (OUTPATIENT)
Age: 64
End: 2024-12-04

## 2024-12-04 NOTE — TELEPHONE ENCOUNTER
[facility]  called and they want to know if they can change the Norco 10 to taking 5 a day instead of 6     Please call back at [phone]

## 2024-12-10 ENCOUNTER — TELEPHONE (OUTPATIENT)
Age: 64
End: 2024-12-10

## 2024-12-10 NOTE — TELEPHONE ENCOUNTER
Patient called to see when her post op appointment is and it is scheduled for 12/20 but she states her paperwork says 12/12 so she isnt sure what's going on and what clarification since she needs someone to take her. Please call the patient

## 2024-12-13 ENCOUNTER — OFFICE VISIT (OUTPATIENT)
Age: 64
End: 2024-12-13

## 2024-12-13 VITALS — WEIGHT: 163 LBS | BODY MASS INDEX: 26.2 KG/M2 | HEIGHT: 66 IN

## 2024-12-13 DIAGNOSIS — Z47.89 AFTERCARE FOLLOWING SURGERY OF THE MUSCULOSKELETAL SYSTEM: Primary | ICD-10-CM

## 2024-12-13 DIAGNOSIS — M20.11 HALLUX VALGUS (ACQUIRED), RIGHT FOOT: ICD-10-CM

## 2024-12-13 DIAGNOSIS — M79.671 PAIN IN RIGHT FOOT: ICD-10-CM

## 2024-12-13 PROCEDURE — 99024 POSTOP FOLLOW-UP VISIT: CPT | Performed by: PODIATRIST

## 2024-12-13 RX ORDER — ONDANSETRON 4 MG/1
4 TABLET, FILM COATED ORAL EVERY 4 HOURS
COMMUNITY
Start: 2024-12-03

## 2024-12-13 RX ORDER — ENOXAPARIN SODIUM 100 MG/ML
1 INJECTION SUBCUTANEOUS
COMMUNITY

## 2024-12-13 RX ORDER — ACETAMINOPHEN 500 MG
1000 TABLET ORAL EVERY 6 HOURS PRN
COMMUNITY

## 2024-12-13 RX ORDER — HYDROCODONE BITARTRATE AND ACETAMINOPHEN 10; 325 MG/1; MG/1
1 TABLET ORAL EVERY 6 HOURS PRN
COMMUNITY

## 2024-12-13 NOTE — PROGRESS NOTES
[facility]  [facility] ORTHOPEDICS  [address]  [facility] [id number] [phone]     Patient: Evon Glover   YOB: 1960   Date: 12/13/2024   Visit Type:  Post op    Body Part: Foot right    What was the date of surgery? 12/3/2024    Pt states normal sign & symptoms of post op procedure.     Pt rates pain: 6 on a daily average        History of Present Illness  Chief Complaint   Patient presents with    Post-Op Check Right Foot       This is a 64 y.o. female  presents today for follow up of right foot first tarsometatarsal intercuneiform joint arthrodesis.  Pain is controlled.  She has moved her bowels.    Review of Systems  System  Neg/Pos  Details  Constitutional  Negative  Chills, Fatigue, Fever and Night Sweats  Respiratory  Negative  Chest Pain, Cough and Dyspnea  Cardio   Negative  Leg Swelling  GI   Negative  Abdominal Pain, Constipation, Nausea and Vomiting     Negative  Urinary Incontinence   Endocrine  Negative  Weight Gain and Weight Loss  MS   Negative  Except as noted in HPI and Chief Complaint    Past Medical History:   Diagnosis Date    CAD (coronary artery disease) 09/13/2013    EF 20-25% 2013, EF 50-55% 2016; [facility] cardiology    Carpal tunnel syndrome     bilateral    COVID-19 01/2022    slight headache and fatigue    Diabetes mellitus (HCC)     Fibromyalgia     Heart disease     HTN (hypertension)     Hyperlipidemia     Mixed hyperlipidemia 07/19/2017    Osteoarthritis     PONV (postoperative nausea and vomiting)     Post-menopausal     Sleep apnea     cpap - not tolerated, doesn't use    Thyroid disease       Past Surgical History:   Procedure Laterality Date    ANKLE FRACTURE SURGERY  01/2024    CARDIAC CATHETERIZATION  7/28/13  MDL    angioplasty, stent to LAD.EF 20-25%    CARPAL TUNNEL RELEASE Bilateral 12/27/2016    CARPAL TUNNEL RELEASE performed by Pranav Collazo MD at [facility] OR    FINGER TRIGGER RELEASE

## 2024-12-23 ENCOUNTER — OFFICE VISIT (OUTPATIENT)
Age: 64
End: 2024-12-23

## 2024-12-23 VITALS — WEIGHT: 163 LBS | BODY MASS INDEX: 26.2 KG/M2 | HEIGHT: 66 IN

## 2024-12-23 DIAGNOSIS — Z47.89 AFTERCARE FOLLOWING SURGERY OF THE MUSCULOSKELETAL SYSTEM: Primary | ICD-10-CM

## 2024-12-23 DIAGNOSIS — M20.12 HALLUX VALGUS (ACQUIRED), LEFT FOOT: ICD-10-CM

## 2024-12-23 PROCEDURE — 99024 POSTOP FOLLOW-UP VISIT: CPT | Performed by: NURSE PRACTITIONER

## 2024-12-23 NOTE — PROGRESS NOTES
[facility]  [facility] ORTHOPEDICS  [address]  [facility] [id number] [phone]     Patient: Evon Glover   YOB: 1960   Date: 12/23/2024   Visit Type:      History of Present Illness  Chief Complaint   Patient presents with    Follow-up     Right foot       This is a 64 y.o. female presents today 3 weeks status post right foot surgery including first tarsometatarsal intercuneiform arthrodesis.  She is nonweightbearing.  Pain controlled.  She presents today follow-up and suture removal.  Pain controlled.    Past Medical History:   Diagnosis Date    CAD (coronary artery disease) 09/13/2013    EF 20-25% 2013, EF 50-55% 2016; [facility] cardiology    Carpal tunnel syndrome     bilateral    COVID-19 01/2022    slight headache and fatigue    Diabetes mellitus (HCC)     Fibromyalgia     Heart disease     HTN (hypertension)     Hyperlipidemia     Mixed hyperlipidemia 07/19/2017    Osteoarthritis     PONV (postoperative nausea and vomiting)     Post-menopausal     Sleep apnea     cpap - not tolerated, doesn't use    Thyroid disease       Past Surgical History:   Procedure Laterality Date    ANKLE FRACTURE SURGERY  01/2024    CARDIAC CATHETERIZATION  7/28/13  MDL    angioplasty, stent to LAD.EF 20-25%    CARPAL TUNNEL RELEASE Bilateral 12/27/2016    CARPAL TUNNEL RELEASE performed by Pranav Collazo MD at [facility] OR    FINGER TRIGGER RELEASE Bilateral 06/30/2022    BILATERAL MIDDLE TRIGGER FINGER A1 PULLEY RELEASE performed by Jamari Broderick MD at [facility] OR    FOOT SURGERY Right 12/03/2024    GASTROCNEMIUS RECESSION Left 01/19/2024    GASTROCNEMIUS RECESSION OF LEFT FOOT performed by Sacha Anderson II, DPM at [facility] OR    HAND SURGERY      ORTHOPEDIC SURGERY      ankle fx. repair 1994    SINUS SURGERY      TOTAL ANKLE ARTHROPLASTY N/A 01/19/2024    TOTAL ANKLE ARTHROPLASTY INFINITY IMPLANT WITH FLAT CUT TALUS performed by Sacha Anderson

## 2024-12-23 NOTE — PATIENT INSTRUCTIONS
Can begin partial weightbearing progressing to weightbearing in boot.  Start with 5 to 10% of weight increase by 5 to 10% of weight each day until full weightbearing in boot.    After 5 days can wash incision with soap and water.  No soaking.    Follow-up in 5 weeks for repeat x-rays.

## 2024-12-23 NOTE — PROGRESS NOTES
[facility]  [facility] ORTHOPEDICS  [address]  [facility] [id number] [phone]     Patient: Evon Glover   YOB: 1960   Date: 12/23/2024   Visit Type:  Post op    Body Part: Foot right    What was the date of surgery? 12/03/24    Pain medication refill? No

## 2024-12-26 ENCOUNTER — TELEPHONE (OUTPATIENT)
Age: 64
End: 2024-12-26

## 2024-12-26 NOTE — TELEPHONE ENCOUNTER
Patient called asked about the aftercare she was given when she came in and saw Sola the other day, she cannot find her paper work.  I read the aftercare plan notes to her.

## 2025-01-06 DIAGNOSIS — L29.9 EAR ITCHING: ICD-10-CM

## 2025-01-07 RX ORDER — FLUOCINOLONE ACETONIDE 0.11 MG/ML
OIL AURICULAR (OTIC)
Qty: 40 ML | Refills: 2 | Status: SHIPPED | OUTPATIENT
Start: 2025-01-07

## 2025-01-27 ENCOUNTER — OFFICE VISIT (OUTPATIENT)
Age: 65
End: 2025-01-27

## 2025-01-27 VITALS — BODY MASS INDEX: 26.2 KG/M2 | WEIGHT: 163 LBS | HEIGHT: 66 IN

## 2025-01-27 DIAGNOSIS — Z47.89 AFTERCARE FOLLOWING SURGERY OF THE MUSCULOSKELETAL SYSTEM: ICD-10-CM

## 2025-01-27 DIAGNOSIS — M20.11 HALLUX VALGUS (ACQUIRED), RIGHT FOOT: Primary | ICD-10-CM

## 2025-01-27 PROCEDURE — 99024 POSTOP FOLLOW-UP VISIT: CPT | Performed by: NURSE PRACTITIONER

## 2025-01-27 NOTE — PROGRESS NOTES
[facility]  [facility] ORTHOPEDICS  [address]  [facility] [id number] [phone]     Patient: Evon Glover   YOB: 1960   Date: 1/27/2025   Visit Type:  Post op    Body Part: Foot right    What was the date of surgery? 12/3/24    Patient states her foot feels good and feels the boot is hurting more than anything.

## 2025-01-27 NOTE — PROGRESS NOTES
[facility]  [facility] ORTHOPEDICS  [address]  [facility] [id number] [phone]     Patient: Evon Glover   YOB: 1960   Date: 1/27/2025   Visit Type:      History of Present Illness  Chief Complaint   Patient presents with    Follow-up     Right foot       This is a 64 y.o. female presents today almost 8 weeks status post right foot surgery including first tarsometatarsal intercuneiform arthrodesis.  She is doing well.  Denies any complaints of pain.  Weightbearing in her boot.  Past Medical History:   Diagnosis Date    CAD (coronary artery disease) 09/13/2013    EF 20-25% 2013, EF 50-55% 2016; [facility] cardiology    Carpal tunnel syndrome     bilateral    COVID-19 01/2022    slight headache and fatigue    Diabetes mellitus (HCC)     Fibromyalgia     Heart disease     HTN (hypertension)     Hyperlipidemia     Mixed hyperlipidemia 07/19/2017    Osteoarthritis     PONV (postoperative nausea and vomiting)     Post-menopausal     Sleep apnea     cpap - not tolerated, doesn't use    Thyroid disease       Past Surgical History:   Procedure Laterality Date    ANKLE FRACTURE SURGERY  01/2024    CARDIAC CATHETERIZATION  7/28/13  MDL    angioplasty, stent to LAD.EF 20-25%    CARPAL TUNNEL RELEASE Bilateral 12/27/2016    CARPAL TUNNEL RELEASE performed by Pranav Collazo MD at [facility] OR    FINGER TRIGGER RELEASE Bilateral 06/30/2022    BILATERAL MIDDLE TRIGGER FINGER A1 PULLEY RELEASE performed by Jamari Broderick MD at [facility] OR    FOOT SURGERY Right 12/03/2024    GASTROCNEMIUS RECESSION Left 01/19/2024    GASTROCNEMIUS RECESSION OF LEFT FOOT performed by Sacha Anderson II, DPM at [facility] OR    HAND SURGERY      ORTHOPEDIC SURGERY      ankle fx. repair 1994    SINUS SURGERY      TOTAL ANKLE ARTHROPLASTY N/A 01/19/2024    TOTAL ANKLE ARTHROPLASTY INFINITY IMPLANT WITH FLAT CUT TALUS performed by Sacha Anderson II, DPM at [facility] OR      Social

## 2025-02-10 ENCOUNTER — OFFICE VISIT (OUTPATIENT)
Age: 65
End: 2025-02-10
Payer: COMMERCIAL

## 2025-02-10 VITALS — WEIGHT: 163 LBS | BODY MASS INDEX: 26.2 KG/M2 | HEIGHT: 66 IN

## 2025-02-10 DIAGNOSIS — M65.341 TRIGGER FINGER, RIGHT RING FINGER: Primary | ICD-10-CM

## 2025-02-10 PROCEDURE — 99213 OFFICE O/P EST LOW 20 MIN: CPT | Performed by: PHYSICIAN ASSISTANT

## 2025-02-10 PROCEDURE — 1036F TOBACCO NON-USER: CPT | Performed by: PHYSICIAN ASSISTANT

## 2025-02-10 PROCEDURE — G8427 DOCREV CUR MEDS BY ELIG CLIN: HCPCS | Performed by: PHYSICIAN ASSISTANT

## 2025-02-10 PROCEDURE — 3017F COLORECTAL CA SCREEN DOC REV: CPT | Performed by: PHYSICIAN ASSISTANT

## 2025-02-10 PROCEDURE — G8417 CALC BMI ABV UP PARAM F/U: HCPCS | Performed by: PHYSICIAN ASSISTANT

## 2025-02-10 PROCEDURE — 20550 NJX 1 TENDON SHEATH/LIGAMENT: CPT | Performed by: PHYSICIAN ASSISTANT

## 2025-02-10 RX ORDER — BETAMETHASONE SODIUM PHOSPHATE AND BETAMETHASONE ACETATE 3; 3 MG/ML; MG/ML
6 INJECTION, SUSPENSION INTRA-ARTICULAR; INTRALESIONAL; INTRAMUSCULAR; SOFT TISSUE ONCE
Status: COMPLETED | OUTPATIENT
Start: 2025-02-10 | End: 2025-02-10

## 2025-02-10 RX ORDER — LIDOCAINE HYDROCHLORIDE 10 MG/ML
0.5 INJECTION, SOLUTION INFILTRATION; PERINEURAL ONCE
Status: COMPLETED | OUTPATIENT
Start: 2025-02-10 | End: 2025-02-10

## 2025-02-10 RX ADMIN — BETAMETHASONE SODIUM PHOSPHATE AND BETAMETHASONE ACETATE 6 MG: 3; 3 INJECTION, SUSPENSION INTRA-ARTICULAR; INTRALESIONAL; INTRAMUSCULAR; SOFT TISSUE at 11:59

## 2025-02-10 RX ADMIN — LIDOCAINE HYDROCHLORIDE 0.5 ML: 10 INJECTION, SOLUTION INFILTRATION; PERINEURAL at 12:00

## 2025-02-10 ASSESSMENT — ENCOUNTER SYMPTOMS: SHORTNESS OF BREATH: 0

## 2025-02-10 NOTE — PROGRESS NOTES
[facility]  [facility] ORTHOPEDICS  [address] [place] [address]  Dept: [phone]  Dept Fax: [phone]  Loc: [phone]     Camilla Glover (:  1960) is a 64 y.o. female,Established patient, here for evaluation of the following:    Chief Complaint   Patient presents with    Hand Pain     R hand           Subjective   Patient is a 64-year-old  female came to the clinic for a right trigger finger issue.  She has been scheduled for a trigger finger release surgery back in July of last year.  She canceled her surgery due to her insurance not allowing her to be at the appropriate facility.  She was post to have surgery at the surgery center in [place] but she had a bad feeling about that facility and did not want to have surgery there.  She reports that she went to [place] and had an injection in the in the left finger and it had worked tremendously.  She is here to discuss the right finger injections.    Hand Pain   Pertinent negatives include no chest pain or numbness.       Allergies   Allergen Reactions    Morphine Other (See Comments)     irritability    Duloxetine Other (See Comments)    Varenicline Hives and Rash    Metformin And Related Nausea And Vomiting     GI upset     Past Surgical History:   Procedure Laterality Date    ANKLE FRACTURE SURGERY  2024    CARDIAC CATHETERIZATION  13  MDL    angioplasty, stent to LAD.EF 20-25%    CARPAL TUNNEL RELEASE Bilateral 2016    CARPAL TUNNEL RELEASE performed by Pranav Collazo MD at [facility] OR    FINGER TRIGGER RELEASE Bilateral 2022    BILATERAL MIDDLE TRIGGER FINGER A1 PULLEY RELEASE performed by Jamari Broderick MD at [facility] OR    FOOT SURGERY Right 2024    GASTROCNEMIUS RECESSION Left 2024    GASTROCNEMIUS RECESSION OF LEFT FOOT performed by Sacha Anderson II, DPM at [facility] OR    HAND SURGERY      ORTHOPEDIC SURGERY      ankle fx. repair

## 2025-03-03 ENCOUNTER — OFFICE VISIT (OUTPATIENT)
Age: 65
End: 2025-03-03
Payer: COMMERCIAL

## 2025-03-03 VITALS — BODY MASS INDEX: 26.03 KG/M2 | WEIGHT: 162 LBS | HEIGHT: 66 IN

## 2025-03-03 DIAGNOSIS — M20.11 HALLUX VALGUS (ACQUIRED), RIGHT FOOT: ICD-10-CM

## 2025-03-03 DIAGNOSIS — Z47.89 AFTERCARE FOLLOWING SURGERY OF THE MUSCULOSKELETAL SYSTEM: Primary | ICD-10-CM

## 2025-03-03 PROCEDURE — G8417 CALC BMI ABV UP PARAM F/U: HCPCS | Performed by: NURSE PRACTITIONER

## 2025-03-03 PROCEDURE — G8427 DOCREV CUR MEDS BY ELIG CLIN: HCPCS | Performed by: NURSE PRACTITIONER

## 2025-03-03 PROCEDURE — 99213 OFFICE O/P EST LOW 20 MIN: CPT | Performed by: NURSE PRACTITIONER

## 2025-03-03 PROCEDURE — 3017F COLORECTAL CA SCREEN DOC REV: CPT | Performed by: NURSE PRACTITIONER

## 2025-03-03 PROCEDURE — 1036F TOBACCO NON-USER: CPT | Performed by: NURSE PRACTITIONER

## 2025-03-03 ASSESSMENT — ENCOUNTER SYMPTOMS
COLOR CHANGE: 0
CONSTIPATION: 0
DIARRHEA: 0
BLOOD IN STOOL: 0
SHORTNESS OF BREATH: 0
COUGH: 0
NAUSEA: 0
VOMITING: 0

## 2025-03-03 NOTE — PROGRESS NOTES
[facility]  [facility] ORTHOPEDICS  [address]  [facility] [id number] [phone]     Patient: Camilla Glover   YOB: 1960   Date: 3/3/2025   Visit Type:  Follow up    Body Part: Foot right    What was the date of surgery? 12/3/24    Patient states her foot is doing alright.  Still a little sore and a little numb at times.    Review of Systems    Review of Systems   Constitutional:  Negative for appetite change, chills, fatigue and fever.   Respiratory:  Negative for cough and shortness of breath.    Cardiovascular:  Negative for chest pain, palpitations and leg swelling.   Gastrointestinal:  Negative for blood in stool, constipation, diarrhea, nausea and vomiting.   Musculoskeletal:  Negative for arthralgias, gait problem and joint swelling.   Skin:  Negative for color change and wound.   Neurological:  Negative for weakness.

## 2025-03-03 NOTE — PROGRESS NOTES
[facility]  [facility] ORTHOPEDICS  [address]  [facility] [id number] [phone]     Patient: Camilla Glover   YOB: 1960   Date: 3/3/2025   Visit Type:      History of Present Illness  Chief Complaint   Patient presents with    Follow-up     Right foot       This is a 64 y.o. female presents today 12 weeks and 5 days status post right foot surgery including first tarsometatarsal intercuneiform arthrodesis.  She is doing well.  She is back in a regular shoe.  She could not tolerate the carbon fiber insole.  She relates some occasional pain.  She states is not constant.  Past Medical History:   Diagnosis Date    CAD (coronary artery disease) 09/13/2013    EF 20-25% 2013, EF 50-55% 2016; [facility] cardiology    Carpal tunnel syndrome     bilateral    COVID-19 01/2022    slight headache and fatigue    Diabetes mellitus (HCC)     Fibromyalgia     Heart disease     HTN (hypertension)     Hyperlipidemia     Mixed hyperlipidemia 07/19/2017    Osteoarthritis     PONV (postoperative nausea and vomiting)     Post-menopausal     Sleep apnea     cpap - not tolerated, doesn't use    Thyroid disease       Past Surgical History:   Procedure Laterality Date    ANKLE FRACTURE SURGERY  01/2024    CARDIAC CATHETERIZATION  7/28/13  MDL    angioplasty, stent to LAD.EF 20-25%    CARPAL TUNNEL RELEASE Bilateral 12/27/2016    CARPAL TUNNEL RELEASE performed by Pranav Collazo MD at [facility] OR    FINGER TRIGGER RELEASE Bilateral 06/30/2022    BILATERAL MIDDLE TRIGGER FINGER A1 PULLEY RELEASE performed by Jamari Broderick MD at [facility] OR    FOOT SURGERY Right 12/03/2024    GASTROCNEMIUS RECESSION Left 01/19/2024    GASTROCNEMIUS RECESSION OF LEFT FOOT performed by Sacha Anderson II, DPM at [facility] OR    HAND SURGERY      ORTHOPEDIC SURGERY      ankle fx. repair 1994    SINUS SURGERY      TOTAL ANKLE ARTHROPLASTY N/A 01/19/2024    TOTAL ANKLE ARTHROPLASTY

## 2025-03-17 DIAGNOSIS — Z91.09 ENVIRONMENTAL ALLERGIES: ICD-10-CM

## 2025-03-17 DIAGNOSIS — H69.93 EUSTACHIAN TUBE DYSFUNCTION, BILATERAL: ICD-10-CM

## 2025-03-17 RX ORDER — MONTELUKAST SODIUM 10 MG/1
10 TABLET ORAL NIGHTLY
Qty: 90 TABLET | Refills: 1 | Status: SHIPPED | OUTPATIENT
Start: 2025-03-17

## 2025-03-27 DIAGNOSIS — Z91.09 ENVIRONMENTAL ALLERGIES: ICD-10-CM

## 2025-03-27 DIAGNOSIS — L29.9 EAR ITCHING: ICD-10-CM

## 2025-03-27 DIAGNOSIS — H69.93 EUSTACHIAN TUBE DYSFUNCTION, BILATERAL: ICD-10-CM

## 2025-03-27 RX ORDER — LEVOCETIRIZINE DIHYDROCHLORIDE 5 MG/1
5 TABLET, FILM COATED ORAL NIGHTLY
Qty: 90 TABLET | Refills: 1 | Status: SHIPPED | OUTPATIENT
Start: 2025-03-27

## 2025-03-27 RX ORDER — FLUOCINOLONE ACETONIDE 0.11 MG/ML
OIL AURICULAR (OTIC)
Qty: 100 ML | Refills: 1 | Status: SHIPPED | OUTPATIENT
Start: 2025-03-27

## 2025-04-14 ENCOUNTER — OFFICE VISIT (OUTPATIENT)
Age: 65
End: 2025-04-14
Payer: COMMERCIAL

## 2025-04-14 VITALS — WEIGHT: 162 LBS | BODY MASS INDEX: 26.03 KG/M2 | HEIGHT: 66 IN

## 2025-04-14 DIAGNOSIS — Z47.89 AFTERCARE FOLLOWING SURGERY OF THE MUSCULOSKELETAL SYSTEM: ICD-10-CM

## 2025-04-14 DIAGNOSIS — M20.11 HALLUX VALGUS (ACQUIRED), RIGHT FOOT: Primary | ICD-10-CM

## 2025-04-14 PROCEDURE — 1036F TOBACCO NON-USER: CPT | Performed by: NURSE PRACTITIONER

## 2025-04-14 PROCEDURE — 99213 OFFICE O/P EST LOW 20 MIN: CPT | Performed by: NURSE PRACTITIONER

## 2025-04-14 PROCEDURE — G8417 CALC BMI ABV UP PARAM F/U: HCPCS | Performed by: NURSE PRACTITIONER

## 2025-04-14 PROCEDURE — 3017F COLORECTAL CA SCREEN DOC REV: CPT | Performed by: NURSE PRACTITIONER

## 2025-04-14 PROCEDURE — G8427 DOCREV CUR MEDS BY ELIG CLIN: HCPCS | Performed by: NURSE PRACTITIONER

## 2025-04-14 ASSESSMENT — ENCOUNTER SYMPTOMS
CONSTIPATION: 0
COUGH: 0
BLOOD IN STOOL: 0
NAUSEA: 0
COLOR CHANGE: 0
DIARRHEA: 0
VOMITING: 0
SHORTNESS OF BREATH: 0

## 2025-04-14 NOTE — PROGRESS NOTES
[facility]  [facility] ORTHOPEDICS  [address]  [facility] [id number] [phone]     Patient: Evon Glover   YOB: 1960   Date: 4/14/2025   Visit Type:  Follow up    Body Part: Foot right    What was the date of surgery? 12/3/24    Patient states her foot is still numb near the area where the plate is in her foot.  Reports no pain and states she's walking okay.    Review of Systems    Review of Systems   Constitutional:  Negative for appetite change, chills, fatigue and fever.   Respiratory:  Negative for cough and shortness of breath.    Cardiovascular:  Negative for chest pain, palpitations and leg swelling.   Gastrointestinal:  Negative for blood in stool, constipation, diarrhea, nausea and vomiting.   Musculoskeletal:  Negative for arthralgias, gait problem and joint swelling.   Skin:  Negative for color change.   Neurological:  Negative for weakness.

## 2025-04-14 NOTE — PROGRESS NOTES
[facility]  [facility] ORTHOPEDICS  [address]  [facility] [id number] [phone]     Patient: Evon Glover   YOB: 1960   Date: 4/14/2025   Visit Type:      History of Present Illness  Chief Complaint   Patient presents with    Follow-up     Right foot       History of Present Illness  The patient is a 64-year-old female who presents today for follow-up of right foot surgery, including first tarsometatarsal and cuneiform arthrodesis. The procedure was performed on 12/03/2024. She is back in a regular shoe but reports some numbness.    Persistent numbness in the right foot is described as an unusual sensation rather than pain. The numbness is primarily localized, and there is no significant pain associated with it. The patient has been applying Mederma to the surgical site, noting that the scar is gradually fading and the overall appearance of the area is improving. The pain level is significantly reduced compared to the pre-surgical state.        Past Medical History:   Diagnosis Date    CAD (coronary artery disease) 09/13/2013    EF 20-25% 2013, EF 50-55% 2016; [facility] cardiology    Carpal tunnel syndrome     bilateral    COVID-19 01/2022    slight headache and fatigue    Diabetes mellitus (HCC)     Fibromyalgia     Heart disease     HTN (hypertension)     Hyperlipidemia     Mixed hyperlipidemia 07/19/2017    Osteoarthritis     PONV (postoperative nausea and vomiting)     Post-menopausal     Sleep apnea     cpap - not tolerated, doesn't use    Thyroid disease       Past Surgical History:   Procedure Laterality Date    ANKLE FRACTURE SURGERY  01/2024    CARDIAC CATHETERIZATION  7/28/13  MDL    angioplasty, stent to LAD.EF 20-25%    CARPAL TUNNEL RELEASE Bilateral 12/27/2016    CARPAL TUNNEL RELEASE performed by Pranav Collazo MD at [facility] OR    FINGER TRIGGER RELEASE Bilateral 06/30/2022    BILATERAL MIDDLE TRIGGER FINGER A1 PULLEY

## 2025-04-24 ENCOUNTER — TELEPHONE (OUTPATIENT)
Dept: CARDIOLOGY CLINIC | Age: 65
End: 2025-04-24

## 2025-04-24 ENCOUNTER — OFFICE VISIT (OUTPATIENT)
Dept: CARDIOLOGY CLINIC | Age: 65
End: 2025-04-24
Payer: COMMERCIAL

## 2025-04-24 VITALS — HEART RATE: 80 BPM | DIASTOLIC BLOOD PRESSURE: 80 MMHG | OXYGEN SATURATION: 97 % | SYSTOLIC BLOOD PRESSURE: 120 MMHG

## 2025-04-24 DIAGNOSIS — I10 ESSENTIAL HYPERTENSION: ICD-10-CM

## 2025-04-24 DIAGNOSIS — E78.2 MIXED HYPERLIPIDEMIA: ICD-10-CM

## 2025-04-24 DIAGNOSIS — R06.02 SHORTNESS OF BREATH: ICD-10-CM

## 2025-04-24 DIAGNOSIS — R07.9 CHEST PAIN, UNSPECIFIED TYPE: ICD-10-CM

## 2025-04-24 DIAGNOSIS — I25.10 CORONARY ARTERY DISEASE INVOLVING NATIVE CORONARY ARTERY OF NATIVE HEART WITHOUT ANGINA PECTORIS: Primary | ICD-10-CM

## 2025-04-24 DIAGNOSIS — I51.89 LEFT VENTRICULAR SYSTOLIC DYSFUNCTION: ICD-10-CM

## 2025-04-24 DIAGNOSIS — R07.9 CHEST PAIN, UNSPECIFIED TYPE: Primary | ICD-10-CM

## 2025-04-24 DIAGNOSIS — Z95.5 H/O HEART ARTERY STENT: ICD-10-CM

## 2025-04-24 PROCEDURE — G8417 CALC BMI ABV UP PARAM F/U: HCPCS | Performed by: INTERNAL MEDICINE

## 2025-04-24 PROCEDURE — 3074F SYST BP LT 130 MM HG: CPT | Performed by: INTERNAL MEDICINE

## 2025-04-24 PROCEDURE — 3079F DIAST BP 80-89 MM HG: CPT | Performed by: INTERNAL MEDICINE

## 2025-04-24 PROCEDURE — 3017F COLORECTAL CA SCREEN DOC REV: CPT | Performed by: INTERNAL MEDICINE

## 2025-04-24 PROCEDURE — G8428 CUR MEDS NOT DOCUMENT: HCPCS | Performed by: INTERNAL MEDICINE

## 2025-04-24 PROCEDURE — 1036F TOBACCO NON-USER: CPT | Performed by: INTERNAL MEDICINE

## 2025-04-24 PROCEDURE — 99214 OFFICE O/P EST MOD 30 MIN: CPT | Performed by: INTERNAL MEDICINE

## 2025-04-24 PROCEDURE — 93000 ELECTROCARDIOGRAM COMPLETE: CPT | Performed by: INTERNAL MEDICINE

## 2025-04-24 RX ORDER — ISOSORBIDE MONONITRATE 30 MG/1
30 TABLET, EXTENDED RELEASE ORAL DAILY
Qty: 30 TABLET | Refills: 3 | Status: SHIPPED | OUTPATIENT
Start: 2025-04-24

## 2025-04-24 ASSESSMENT — ENCOUNTER SYMPTOMS
RESPIRATORY NEGATIVE: 1
VOMITING: 0
NAUSEA: 0
GASTROINTESTINAL NEGATIVE: 1
SHORTNESS OF BREATH: 0
EYES NEGATIVE: 1
DIARRHEA: 0

## 2025-04-24 NOTE — PROGRESS NOTES
Conditions: Not on file       Physical Examination:  /80   Pulse 80   SpO2 97%   Physical Exam  Vitals reviewed.   Constitutional:       Appearance: She is well-developed.   Neck:      Vascular: No carotid bruit or JVD.   Cardiovascular:      Rate and Rhythm: Normal rate and regular rhythm.      Heart sounds: Normal heart sounds. No murmur heard.     No friction rub. No gallop.   Pulmonary:      Effort: Pulmonary effort is normal. No respiratory distress.      Breath sounds: Normal breath sounds. No wheezing or rales.   Abdominal:      General: There is no distension.      Tenderness: There is no abdominal tenderness.   Lymphadenopathy:      Cervical: No cervical adenopathy.   Skin:     General: Skin is warm and dry.             ASSESSMENT:     Diagnosis Orders   1. Coronary artery disease involving native coronary artery of native heart without angina pectoris  EKG 12 lead      2. Mixed hyperlipidemia        3. Essential hypertension        4. H/O heart artery stent        5. Left ventricular systolic dysfunction            PLAN:  Orders Placed This Encounter   Procedures    EKG 12 lead     No orders of the defined types were placed in this encounter.        Continue Nitrostat 0.4 sublingual as needed  Bisoprolol-hydrochlorothiazide 5-6.25 mg p.o. daily  Lisinopril 2.5 nightly  Simvastatin 40 mg a day  Aspirin 81 mg a day  Suggest Imdur 30 mg daily  Lexiscan arrange next week  Follow-up assessment in 2 weeks    Return in about 2 weeks (around 5/8/2025) for return to Dr. Dumont only.      Truman Dumont MD 4/24/2025 9:17 AM CDT    [facility]      Thisdictation was generated by voice recognition computer software.  Although all attempts are made to edit the dictation for accuracy, there may be errors in the transcription that are not intended.

## 2025-04-24 NOTE — TELEPHONE ENCOUNTER
I called the patient and left a VM for them stating the time, date, location and preps for testing as well as the follow up exam. 4/24/25 AH

## 2025-05-15 ENCOUNTER — OFFICE VISIT (OUTPATIENT)
Dept: CARDIOLOGY CLINIC | Age: 65
End: 2025-05-15
Payer: COMMERCIAL

## 2025-05-15 VITALS
OXYGEN SATURATION: 97 % | HEIGHT: 66 IN | DIASTOLIC BLOOD PRESSURE: 72 MMHG | BODY MASS INDEX: 26.84 KG/M2 | HEART RATE: 63 BPM | WEIGHT: 167 LBS | SYSTOLIC BLOOD PRESSURE: 102 MMHG

## 2025-05-15 DIAGNOSIS — Z95.5 H/O HEART ARTERY STENT: ICD-10-CM

## 2025-05-15 DIAGNOSIS — I10 ESSENTIAL HYPERTENSION: ICD-10-CM

## 2025-05-15 DIAGNOSIS — E78.2 MIXED HYPERLIPIDEMIA: ICD-10-CM

## 2025-05-15 DIAGNOSIS — I25.10 CORONARY ARTERY DISEASE INVOLVING NATIVE CORONARY ARTERY OF NATIVE HEART WITHOUT ANGINA PECTORIS: Primary | ICD-10-CM

## 2025-05-15 DIAGNOSIS — I51.89 LEFT VENTRICULAR SYSTOLIC DYSFUNCTION: ICD-10-CM

## 2025-05-15 PROCEDURE — G8417 CALC BMI ABV UP PARAM F/U: HCPCS | Performed by: INTERNAL MEDICINE

## 2025-05-15 PROCEDURE — 3017F COLORECTAL CA SCREEN DOC REV: CPT | Performed by: INTERNAL MEDICINE

## 2025-05-15 PROCEDURE — 3074F SYST BP LT 130 MM HG: CPT | Performed by: INTERNAL MEDICINE

## 2025-05-15 PROCEDURE — 1036F TOBACCO NON-USER: CPT | Performed by: INTERNAL MEDICINE

## 2025-05-15 PROCEDURE — 3078F DIAST BP <80 MM HG: CPT | Performed by: INTERNAL MEDICINE

## 2025-05-15 PROCEDURE — 99213 OFFICE O/P EST LOW 20 MIN: CPT | Performed by: INTERNAL MEDICINE

## 2025-05-15 PROCEDURE — G8427 DOCREV CUR MEDS BY ELIG CLIN: HCPCS | Performed by: INTERNAL MEDICINE

## 2025-05-15 ASSESSMENT — ENCOUNTER SYMPTOMS
SHORTNESS OF BREATH: 0
RESPIRATORY NEGATIVE: 1
NAUSEA: 0
DIARRHEA: 0
EYES NEGATIVE: 1
VOMITING: 0
GASTROINTESTINAL NEGATIVE: 1

## 2025-05-15 NOTE — PROGRESS NOTES
[facility]                            Date:  5/15/2025  Patient: Evon Glover  Age:  64 y.o., 1960      Reason for evaluation:         SUBJECTIVE:    Returns today follow-up assessment for coronary artery disease chest heaviness previous stent hypertension overall doing better at this time.  Since her last visit chest heaviness has resolved.  We had prescribed Imdur 30 mg daily but it took her over 10 days to get the prescription and at that time her symptoms had resolved so she never started taking it.  She had however cut her dose of bisoprolol in half as apparently she was somewhat lethargic and dizzy and that seems to have helped resolve the symptoms she will continue on that reduced dose for the time being.  No other complaints or issues reported blood pressure 102/72 heart 63 today.    Review of Systems   Constitutional: Negative.  Negative for chills, fever and unexpected weight change.   HENT: Negative.     Eyes: Negative.    Respiratory: Negative.  Negative for shortness of breath.    Cardiovascular: Negative.  Negative for chest pain.   Gastrointestinal: Negative.  Negative for diarrhea, nausea and vomiting.   Endocrine: Negative.    Genitourinary: Negative.    Musculoskeletal: Negative.    Skin: Negative.    Neurological: Negative.    All other systems reviewed and are negative.        OBJECTIVE:    /72   Pulse 63   Ht 1.676 m (5' 6\")   Wt 75.8 kg (167 lb)   SpO2 97%   BMI 26.95 kg/m²     Labs:   CBC: No results for input(s): \"WBC\", \"HGB\", \"HCT\", \"PLT\" in the last 72 hours.  BMP:No results for input(s): \"NA\", \"K\", \"CO2\", \"BUN\", \"CREATININE\", \"LABGLOM\", \"GLUCOSE\" in the last 72 hours.  BNP: No results for input(s): \"BNP\" in the last 72 hours.  PT/INR: No results for input(s): \"PROTIME\", \"INR\" in the last 72 hours.  APTT:No results for input(s): \"APTT\" in the last 72 hours.  CARDIAC ENZYMES:No results for input(s): \"CKTOTAL\", \"CKMB\", \"CKMBINDEX\", \"TROPONINI\" in

## 2025-06-09 ENCOUNTER — OFFICE VISIT (OUTPATIENT)
Age: 65
End: 2025-06-09
Payer: COMMERCIAL

## 2025-06-09 VITALS — BODY MASS INDEX: 26.03 KG/M2 | WEIGHT: 162 LBS | HEIGHT: 66 IN

## 2025-06-09 DIAGNOSIS — M79.671 RIGHT FOOT PAIN: ICD-10-CM

## 2025-06-09 DIAGNOSIS — Z96.9 RETAINED ORTHOPEDIC HARDWARE: Primary | ICD-10-CM

## 2025-06-09 DIAGNOSIS — R26.9 GAIT DISTURBANCE: ICD-10-CM

## 2025-06-09 DIAGNOSIS — Z96.662 HISTORY OF TOTAL REPLACEMENT OF LEFT ANKLE: ICD-10-CM

## 2025-06-09 PROCEDURE — 3017F COLORECTAL CA SCREEN DOC REV: CPT | Performed by: PODIATRIST

## 2025-06-09 PROCEDURE — 1036F TOBACCO NON-USER: CPT | Performed by: PODIATRIST

## 2025-06-09 PROCEDURE — G8417 CALC BMI ABV UP PARAM F/U: HCPCS | Performed by: PODIATRIST

## 2025-06-09 PROCEDURE — 99213 OFFICE O/P EST LOW 20 MIN: CPT | Performed by: PODIATRIST

## 2025-06-09 PROCEDURE — G8427 DOCREV CUR MEDS BY ELIG CLIN: HCPCS | Performed by: PODIATRIST

## 2025-06-09 NOTE — PROGRESS NOTES
intranasal route.      nitroGLYCERIN (NITROSTAT) 0.4 MG SL tablet Place 1 tablet under the tongue every 5 minutes as needed for Chest pain 25 tablet 3    allopurinol (ZYLOPRIM) 100 MG tablet Take 2 tablets by mouth daily      lisinopril (PRINIVIL;ZESTRIL) 2.5 MG tablet Take 1 tablet by mouth nightly Indications: High Blood Pressure      simvastatin (ZOCOR) 40 MG tablet Take 1 tablet by mouth nightly Indications: CHANGES IN CHOLESTEROL (INACTIVE)      citalopram (CELEXA) 40 MG tablet Take 1 tablet by mouth daily      pantoprazole (PROTONIX) 40 MG tablet Take 1 tablet by mouth nightly      folic acid (FOLVITE) 1 MG tablet Take 1 tablet by mouth nightly      aspirin 81 MG tablet Take 1 tablet by mouth daily      bisoprolol-hydroCHLOROthiazide (ZIAC) 5-6.25 MG per tablet Take 1 tablet by mouth daily Indications: High Blood Pressure       No current facility-administered medications for this visit.        Allergies  Allergies   Allergen Reactions    Morphine Other (See Comments)     irritability    Duloxetine Other (See Comments)    Varenicline Hives and Rash    Metformin And Related Nausea And Vomiting     GI upset        Ht 1.676 m (5' 6\")   Wt 73.5 kg (162 lb)   BMI 26.15 kg/m²      Physical Exam   Physical Examination:  General: The patient is a Well Nourished 64 y.o. female who is alert and oriented x3 in no distress. The patient is cooperative during the exam.  Psychological: Is a pleasant female, good mood and affect, answers questions appropriately.  Head and Neck: Normocephalic, Atraumatic. Neck supple, no evidence of masses.   Abdomen: Soft, nontender, nondistended.  Eyes: Perrla. Extraocular movements intact.   Respiratory: Rate and effort within normal limits.   Vascular: Palpable dorsalis pedis and posterior tibial pulse bilaterally.  She does have mild edema over her first tarsometatarsal joint arthrodesis site.  Neurological: Adequate sharp and dull sensation bilateral lower extremities.  Dermatological:

## 2025-07-18 ENCOUNTER — OFFICE VISIT (OUTPATIENT)
Dept: CARDIOLOGY | Facility: CLINIC | Age: 65
End: 2025-07-18
Payer: COMMERCIAL

## 2025-07-18 VITALS
BODY MASS INDEX: 28.16 KG/M2 | OXYGEN SATURATION: 100 % | WEIGHT: 169 LBS | HEIGHT: 65 IN | SYSTOLIC BLOOD PRESSURE: 136 MMHG | DIASTOLIC BLOOD PRESSURE: 80 MMHG | HEART RATE: 67 BPM

## 2025-07-18 DIAGNOSIS — I25.10 CORONARY ARTERY DISEASE INVOLVING NATIVE CORONARY ARTERY OF NATIVE HEART WITHOUT ANGINA PECTORIS: Primary | ICD-10-CM

## 2025-07-18 DIAGNOSIS — E78.2 MIXED HYPERLIPIDEMIA: ICD-10-CM

## 2025-07-18 DIAGNOSIS — I10 PRIMARY HYPERTENSION: ICD-10-CM

## 2025-07-18 PROCEDURE — 93000 ELECTROCARDIOGRAM COMPLETE: CPT | Performed by: INTERNAL MEDICINE

## 2025-07-18 PROCEDURE — 99204 OFFICE O/P NEW MOD 45 MIN: CPT | Performed by: INTERNAL MEDICINE

## 2025-07-18 RX ORDER — BISOPROLOL FUMARATE 5 MG/1
5 TABLET, FILM COATED ORAL DAILY
COMMUNITY

## 2025-07-18 NOTE — PROGRESS NOTES
Subjective:     Encounter Date: 07/18/2025      Patient ID: Daisy Fu is a 64 y.o. female with coronary artery disease, prior PCI to the left anterior descending artery, hypertension, hyperlipidemia, diabetes mellitus, presenting today to establish care.    Referring provider: ERICA Brown  Reason for referral: Coronary artery disease    Chief Complaint: Here to establish care, coronary artery disease with prior PCI    History of Present Illness    This is a 64-year-old female with coronary artery disease, status post remote PCI of the LAD in 2013, hypertension, hyperlipidemia, type 2 diabetes mellitus who presents today to establish care.  She says that from a cardiac standpoint she has been doing well and feeling well.  She denies having any chest pain at this time.  She has no significant shortness of breath or dyspnea with exertion.  She does note some generalized fatigue which has been present for quite some time.  She also has a history of significant arthritis and joint pain which does limit activities to some degree but she is trying to walk about 2 miles 4 times per week and has been doing well with this.  Her blood pressure has been well-controlled and historically, her cholesterol has been well-controlled.  She has lost a significant amount of weight over the last year and trying to take care of herself better and watching her diet and in the treatment of her diabetes.  She denies having palpitations, lightheadedness, dizziness, syncope, lower extremity edema.  Overall, she says that she seems to be doing well and feeling well.  She remains on aspirin and notes no significant bleeding issues.      The following portions of the patient's history were reviewed and updated as appropriate: allergies, current medications, past family history, past medical history, past social history, past surgical history, and problem list.    Past Medical History:   Diagnosis Date    Coronary artery disease      Diabetes mellitus     Disease of thyroid gland     GERD (gastroesophageal reflux disease)     Hyperlipidemia     Hypertension     OA (osteoarthritis)     PONV (postoperative nausea and vomiting)     Sleep apnea      Past Surgical History:   Procedure Laterality Date    ANKLE SURGERY Left 1994    BUNIONECTOMY Right 12/3/2024    Procedure: HALLUX PHALANGEAL OSTEOTOMY WITH INTERNAL FIXATION;  Surgeon: Lion Macedo DPM;  Location:  [facility];  Service: Podiatry;  Laterality: Right;    CORONARY ANGIOPLASTY WITH STENT PLACEMENT  2013    stent to LAD    FOOT FUSION Right 12/3/2024    Procedure: FIRST TARSOMETATARSAL AND INTERCUNEIFORM JOINT ARTHRODESIS, BONE GRAFT HARVEST CALCANEUS, HALLUX PHALANGEAL OSTEOTOMY WITH INTERNAL FIXATION;  Surgeon: Lion Macedo DPM;  Location:  [facility];  Service: Podiatry;  Laterality: Right;    FOOT NAVICULAR EXCISION OR BONE GRAFT Right 12/3/2024    Procedure: BONE GRAFT HARVEST CALCANEUS, HALLUX PHALANGEAL OSTEOTOM;  Surgeon: Lion Macedo DPM;  Location:  [facility];  Service: Podiatry;  Laterality: Right;       Current Outpatient Medications:     acetaminophen (TYLENOL) 500 MG tablet, Take 2 tablets by mouth Every 6 (Six) Hours As Needed for Mild Pain., Disp: , Rfl:     allopurinol (ZYLOPRIM) 100 MG tablet, Take 2 tablets by mouth Daily., Disp: , Rfl:     aspirin 81 MG chewable tablet, Chew 1 tablet Daily., Disp: , Rfl:     bisoprolol (ZEBeta) 5 MG tablet, Take 1 tablet by mouth Daily., Disp: , Rfl:     citalopram (CeleXA) 40 MG tablet, Take 1 tablet by mouth Daily., Disp: , Rfl:     folic acid (FOLVITE) 1 MG tablet, Take 2 tablets by mouth Every Night., Disp: , Rfl:     levothyroxine (SYNTHROID, LEVOTHROID) 100 MCG tablet, Take 1 tablet by mouth Daily., Disp: , Rfl:     lisinopril (PRINIVIL,ZESTRIL) 2.5 MG tablet, Take 1 tablet by mouth Every Night., Disp: , Rfl:     naloxone (NARCAN) 4 MG/0.1ML nasal spray, Call 911. Don't prime. [place] in 1 nostril for overdose. Repeat  in 2-3 minutes in other nostril if no or minimal breathing/responsiveness., Disp: 2 each, Rfl: 0    pantoprazole (PROTONIX) 40 MG EC tablet, Take 1 tablet by mouth Every Night., Disp: , Rfl:     simvastatin (ZOCOR) 40 MG tablet, Take 1 tablet by mouth Every Night., Disp: , Rfl:     Tirzepatide (Mounjaro) 12.5 MG/0.5ML solution auto-injector, Inject  under the skin into the appropriate area as directed 1 (One) Time Per Week. Every Sunday, Disp: , Rfl:     Allergies   Allergen Reactions    Morphine Confusion    Chantix [Varenicline] Rash     Social History     Tobacco Use    Smoking status: Never    Smokeless tobacco: Not on file   Substance Use Topics    Alcohol use: Never     History reviewed. No pertinent family history.    Review of Systems   Constitutional: Positive for malaise/fatigue and weight loss.   Cardiovascular:  Negative for chest pain, dyspnea on exertion, leg swelling, orthopnea, palpitations, paroxysmal nocturnal dyspnea and syncope.   Respiratory:  Negative for cough, shortness of breath and wheezing.    Hematologic/Lymphatic: Negative for bleeding problem. Does not bruise/bleed easily.   Musculoskeletal:  Positive for arthritis and joint pain.   Gastrointestinal:  Negative for abdominal pain, nausea and vomiting.   Neurological:  Negative for dizziness and light-headedness.   All other systems reviewed and are negative.        ECG 12 Lead    Date/Time: 7/18/2025 11:13 AM  Performed by: Kumar Belle MD    Authorized by: Kumar Belle MD  Comparison: compared with previous ECG from 11/26/2024  Similar to previous ECG  Rhythm: sinus rhythm  Ectopy: unifocal PVCs  Rate: normal  BPM: 86  Conduction: 1st degree AV block  QRS axis: normal  Other findings: poor R wave progression    Clinical impression: abnormal EKG             Objective:     Vitals reviewed.   Constitutional:       General: Not in acute distress.     Appearance: Well-developed and not in distress.   HENT:      Head:  "Normocephalic and atraumatic.   Neck:      Vascular: No carotid bruit or JVD.   Pulmonary:      Effort: Pulmonary effort is normal.      Breath sounds: Normal breath sounds. No wheezing. No rhonchi. No rales.   Cardiovascular:      Normal rate. Regular rhythm.      Murmurs: There is no murmur.      No gallop.    Pulses:     Intact distal pulses.   Edema:     Peripheral edema absent.   Abdominal:      General: Bowel sounds are normal. There is no distension.      Palpations: Abdomen is soft.      Tenderness: There is no abdominal tenderness.   Skin:     General: Skin is warm and dry. There is no cyanosis.      Findings: No erythema or rash.   Neurological:      Mental Status: Alert. Mental status is at baseline.       /80   Pulse 67   Ht 165.1 cm (65\")   Wt 76.7 kg (169 lb)   SpO2 100%   BMI 28.12 kg/m²     Data/Lab Review:     I reviewed the report of a cardiac catheterization performed in 2019 at which point the left main coronary artery was described as normal with other vessels described as having diffuse irregularities with the exception of the ramus intermedius, described as having a 40% stenosis in the midportion of the vessel.  Also, normal systolic function noted at that time.        Assessment:          Diagnosis Plan   1. Coronary artery disease involving native coronary artery of native heart without angina pectoris  ECG 12 Lead      2. Primary hypertension        3. Mixed hyperlipidemia             Plan:       1.  Coronary artery disease: The patient is stable at this time.  She does not describe any anginal symptoms.  Her last cardiac catheterization was in 2019 showing essentially nonobstructive disease.  With no symptoms at this time, would not recommend any further testing.  She does continue aspirin, beta-blocker and statin therapies, which will all be continued at this time.    2.  Primary hypertension: In general, blood pressure is well-controlled.  Continue lisinopril, " bisoprolol.    3.  Mixed hyperlipidemia: The patient states that her lipids have generally been well-controlled.  She remains on statin therapy.  LDL cholesterol goal is at least less than 70 for this patient.  Lipids are followed by her primary care providers.    The patient will follow-up in our clinic in 6 months unless otherwise needed sooner.

## 2025-08-25 ENCOUNTER — OFFICE VISIT (OUTPATIENT)
Age: 65
End: 2025-08-25
Payer: COMMERCIAL

## 2025-08-25 DIAGNOSIS — Z96.9 RETAINED ORTHOPEDIC HARDWARE: Primary | ICD-10-CM

## 2025-08-25 DIAGNOSIS — M79.671 RIGHT FOOT PAIN: ICD-10-CM

## 2025-08-25 PROCEDURE — 1036F TOBACCO NON-USER: CPT | Performed by: PODIATRIST

## 2025-08-25 PROCEDURE — 3017F COLORECTAL CA SCREEN DOC REV: CPT | Performed by: PODIATRIST

## 2025-08-25 PROCEDURE — G8417 CALC BMI ABV UP PARAM F/U: HCPCS | Performed by: PODIATRIST

## 2025-08-25 PROCEDURE — 99213 OFFICE O/P EST LOW 20 MIN: CPT | Performed by: PODIATRIST

## 2025-08-25 PROCEDURE — G8427 DOCREV CUR MEDS BY ELIG CLIN: HCPCS | Performed by: PODIATRIST

## (undated) DEVICE — SUTURE VCRL SZ 0 L36IN ABSRB UD CT-1 L36MM 1/2 CIR TAPR PNT VCP946H

## (undated) DEVICE — DRAPE,U/ SHT,SPLIT,PLAS,STERIL: Brand: MEDLINE

## (undated) DEVICE — BLADE SAW W12.5XL70MM THK1MM RECIP DBL SIDE OFFSET

## (undated) DEVICE — GLOVE SURG SZ 8 L12IN FNGR THK79MIL GRN LTX FREE

## (undated) DEVICE — BNDG ELAS W/CLIP 6IN 10YD LF STRL

## (undated) DEVICE — GLOVE SURG SZ 85 L12IN FNGR THK94MIL TRNSLUC YEL LTX

## (undated) DEVICE — TOWEL,OR,DSP,ST,BLUE,DLX,4/PK,20PK/CS: Brand: MEDLINE

## (undated) DEVICE — GLOVE SURG SZ 85 L12IN FNGR THK79MIL GRN LTX FREE

## (undated) DEVICE — CVR UNIV C/ARM

## (undated) DEVICE — TRAP FLD MINIVAC MEGADYNE 100ML

## (undated) DEVICE — BANDAGE,GAUZE,BULKEE II,4.5"X4.1YD,STRL: Brand: MEDLINE

## (undated) DEVICE — SPEEDRELEASE™ GUIDED RELEASE INSTRUMENT: Brand: SCALPEL

## (undated) DEVICE — Device

## (undated) DEVICE — HANDPIECE SET WITH HIGH FLOW TIP AND SUCTION TUBE: Brand: INTERPULSE

## (undated) DEVICE — DUAL CUT SAGITTAL BLADE

## (undated) DEVICE — 4-PORT MANIFOLD: Brand: NEPTUNE 2

## (undated) DEVICE — SUTURE VCRL SZ 2-0 L27IN ABSRB UD L26MM SH 1/2 CIR J417H

## (undated) DEVICE — SURGICAL SUCTION CONNECTING TUBE WITH MALE CONNECTOR AND SUCTION CLAMP, 2 FT. LONG (.6 M), 5 MM I.D.: Brand: CONMED

## (undated) DEVICE — UNDERCAST PADDING: Brand: DEROYAL

## (undated) DEVICE — DISPOSABLE TOURNIQUET CUFF 34"X4", 1-LINE, BLUE, STERILE, 1EA/PK, 10PK/CS: Brand: ASP MEDICAL

## (undated) DEVICE — GLOVE SURG SZ 8 L11.2IN FNGR THK12.7MIL CUF THK9.7MIL BRN

## (undated) DEVICE — TALAR PEG DRILL: Brand: INBONE

## (undated) DEVICE — C-ARMOR C-ARM EQUIPMENT COVERS CLEAR STERILE UNIVERSAL FIT 12 PER CASE: Brand: C-ARMOR

## (undated) DEVICE — TRITOME™ TRIPLE-EDGE RELEASE INSTRUMENT: Brand: OSTEOTOME

## (undated) DEVICE — DRAPE,EXTREMITY,89X128,STERILE: Brand: MEDLINE

## (undated) DEVICE — SURGIFOAM SPNG SZ 100

## (undated) DEVICE — LARYNGOSCOPE BLDE MAC HNDL M SZ 35 ST CURAPLEX CURAVIEW LED

## (undated) DEVICE — BNDG ELAS ECON W/CLIP 4IN 5YD LF STRL

## (undated) DEVICE — GLV SURG SENSICARE PI ORTHO SZ8 LF STRL

## (undated) DEVICE — 40.0 MM X 11.1 MM X 0.58 MM OSCILLATING BLADE

## (undated) DEVICE — SUT ETHLN 3/0 FS1 30IN 669H

## (undated) DEVICE — ANTIBACTERIAL UNDYED BRAIDED (POLYGLACTIN 910), SYNTHETIC ABSORBABLE SUTURE: Brand: COATED VICRYL

## (undated) DEVICE — COVER,MAYO STAND,STERILE: Brand: MEDLINE

## (undated) DEVICE — SPNG GZ WOVN 4X4IN 12PLY 10/BX STRL

## (undated) DEVICE — CHLORAPREP 26ML ORANGE

## (undated) DEVICE — MINOR CDS: Brand: MEDLINE INDUSTRIES, INC.

## (undated) DEVICE — DISPOSABLE TOURNIQUET CUFF SINGLE BLADDER, SINGLE PORT AND QUICK CONNECT CONNECTOR: Brand: COLOR CUFF

## (undated) DEVICE — BIT DRL L96MM DIA1.5MM MINI QUIK CPL CALIB W/O STP REUSE

## (undated) DEVICE — TIBIAL CORNER DRILL: Brand: INFINITY

## (undated) DEVICE — DRAPE C ARM W41XL65IN UNIV W/ CLP AND RUBBERBAND

## (undated) DEVICE — SUTURE ETHLN SZ 3-0 L30IN NONABSORBABLE BLK FS-1 L24MM 3/8 669H

## (undated) DEVICE — SUTURE VCRL + SZ 3-0 L27IN ABSRB UD L26MM SH 1/2 CIR VCP416H

## (undated) DEVICE — TAPE ADH W4INXL5YD WHT COT E BNDG RUB BASE CURAD

## (undated) DEVICE — BANDAGE COMPR W6INXL12FT SMOOTH FOR LIMB EXSANG ESMARCH

## (undated) DEVICE — PRECISION THIN (9.0 X 0.38 X 25.0MM)

## (undated) DEVICE — BNDG CURAD ADHS 4IN WHT

## (undated) DEVICE — 4.0MM EGG BUR

## (undated) DEVICE — INTENDED FOR TISSUE SEPARATION, AND OTHER PROCEDURES THAT REQUIRE A SHARP SURGICAL BLADE TO PUNCTURE OR CUT.: Brand: BARD-PARKER ® STAINLESS STEEL BLADES

## (undated) DEVICE — BANDAGE GZ W2XL75IN ST RAYON POLY CNFRM STRTCH LTWT

## (undated) DEVICE — SURGICAL PROCEDURE PACK LOWER EXTREMITY LOURDES HOSP

## (undated) DEVICE — C-ARM: Brand: UNBRANDED

## (undated) DEVICE — TUBE ET 7.5MM NSL ORAL BASIC CUF INTMED MURPHY EYE RADPQ

## (undated) DEVICE — SUTURE VCRL SZ 3-0 L27IN ABSRB UD L26MM SH 1/2 CIR J416H

## (undated) DEVICE — Device: Brand: PROPHECY INFINITY

## (undated) DEVICE — SOLUTION IV IRRIG POUR BRL 0.9% SODIUM CHL 2F7124

## (undated) DEVICE — TOTAL TRAY, 16FR 10ML SIL FOLEY, URN: Brand: MEDLINE

## (undated) DEVICE — DRP C/ARMOR

## (undated) DEVICE — APPL DURAPREP IODOPHOR APL 26ML

## (undated) DEVICE — PK EXTRM 30

## (undated) DEVICE — SUTURE VCRL + SZ 2-0 L27IN ABSRB WHT SH 1/2 CIR TAPERCUT VCP417H

## (undated) DEVICE — FRAZIER SUCTION INSTRUMENT 10 FR W/CONTROL VENT & OBTURATOR: Brand: FRAZIER

## (undated) DEVICE — SUTURE ETHLN SZ 4-0 L18IN NONABSORBABLE BLK L19MM PS-2 3/8 1667H

## (undated) DEVICE — BAND BAG 36" X 36": Brand: TIDI

## (undated) DEVICE — PATIENT RETURN ELECTRODE, SINGLE-USE, CONTACT QUALITY MONITORING, ADULT, WITH 9FT CORD, FOR PATIENTS WEIGING OVER 33LBS. (15KG): Brand: MEGADYNE

## (undated) DEVICE — Z DUPLICATE USE 2392649 LARYNGOSCOPE VID TI SPECTRM LOPRO S4 GLIDESCOPE

## (undated) DEVICE — GLOVE SURG SZ 85 CRM LTX FREE POLYISOPRENE POLYMER BEAD ANTI

## (undated) DEVICE — SCREW, BONE REMOVER: Brand: INBONE

## (undated) DEVICE — SYSTEM SKIN CLSR 22CM DERMBND PRINEO

## (undated) DEVICE — TALAR REAMER: Brand: INFINITY

## (undated) DEVICE — GAUZE,SPONGE,FLUFF,6"X6.75",STRL,10/TRAY: Brand: MEDLINE

## (undated) DEVICE — SKIN PREP TRAY W/CHG: Brand: MEDLINE INDUSTRIES, INC.

## (undated) DEVICE — DRESSING,GAUZE,XEROFORM,CURAD,5"X9",ST: Brand: CURAD

## (undated) DEVICE — AUTOGRAFT HARVESTING SYSTEM: Brand: FASTGRAFTER

## (undated) DEVICE — CVR BRD ARM 13X30

## (undated) DEVICE — SUTURE VCRL SZ 3-0 L27IN ABSRB UD L19MM PS-2 3/8 CIR PRIM J427H

## (undated) DEVICE — DRESSING MEPILEX BORDER FLEX LITE 5X12.5CM

## (undated) DEVICE — AGENT HEMSTAT W2XL4IN OXIDIZED REGENERATED CELOS ABSRB SFT

## (undated) DEVICE — SHEET,DRAPE,53X77,STERILE: Brand: MEDLINE

## (undated) DEVICE — ELECTROSURGICAL PENCIL BUTTON SWITCH E-Z CLEAN COATED BLADE ELECTRODE 10 FT (3 M) CORD HOLSTER: Brand: MEGADYNE